# Patient Record
Sex: FEMALE | Race: WHITE | NOT HISPANIC OR LATINO | Employment: FULL TIME | URBAN - METROPOLITAN AREA
[De-identification: names, ages, dates, MRNs, and addresses within clinical notes are randomized per-mention and may not be internally consistent; named-entity substitution may affect disease eponyms.]

---

## 2024-01-03 ENCOUNTER — OFFICE VISIT (OUTPATIENT)
Dept: HEMATOLOGY/ONCOLOGY | Facility: CLINIC | Age: 54
End: 2024-01-03
Payer: COMMERCIAL

## 2024-01-03 DIAGNOSIS — Z12.11 COLON CANCER SCREENING: ICD-10-CM

## 2024-01-03 DIAGNOSIS — Z80.42 FAMILY HISTORY OF PROSTATE CANCER: ICD-10-CM

## 2024-01-03 DIAGNOSIS — Z80.3 FAMILY HISTORY OF BREAST CANCER: ICD-10-CM

## 2024-01-03 DIAGNOSIS — Z71.83 ENCOUNTER FOR NONPROCREATIVE GENETIC COUNSELING: Primary | ICD-10-CM

## 2024-01-03 PROCEDURE — 99205 OFFICE O/P NEW HI 60 MIN: CPT | Mod: S$GLB,,, | Performed by: PHYSICIAN ASSISTANT

## 2024-01-03 PROCEDURE — 99999 PR PBB SHADOW E&M-EST. PATIENT-LVL III: CPT | Mod: PBBFAC,,, | Performed by: PHYSICIAN ASSISTANT

## 2024-01-03 NOTE — PROGRESS NOTES
Hereditary/High Risk Clinic  Department of Hematology and Oncology  [facility]    Cancer Genetics  Initial Consultation    Date of Service:  1/3/24  Visit Provider:  Jessica Leyva PA-C  Collaborating Physician:  Ofe Mao MD    Patient:  Chapincito Burns  :  1970  MRN:   [id number]     Referring Provider    Nidia Neal PA-C  [address] [facility]  [address]  [place] [address]    ASSESSMENT   Chapincito Burns, 53 y.o., assigned female sex at birth, with a personal/family history significant for the following:   Chapincito: None  Mother: Breast 68, living at 74  Maternal aunt: Breast at 60, contralateral at 70,  around 70, no genetic testing  Maternal aunt (Nataly): Breast 75, living, genetic testing negative  MGM: Breast 60,  at 66  Maternal uncle: Prostate     Based on the information provided by Chapincito, her family history of breast cancer in four individuals who are first-degree relatives plus an FDR with prostate cancer is slightly suggestive of a potential hereditary predisposition to cancer and meets current clinical guidelines for genetic testing.  The most informative person to have genetic testing would be Deysi's mother, since she had breast cancer and since Deysi can only inherit mutations her mother has. She will talk to her about testing. The paternal family is not concerning for a hereditary predisposition to cancer. Her paternal aunts had liver and vaginal cancer but they are not known to be hereditary.     PLAN   Deysi will speak to her mother about genetic testing.    Follow up for subsequent discussion regarding genetic testing.  Continue follow up with the high risk breast clinic.   New order placed for colonoscopy. Looks like the other one got closed out.     Visit Diagnosis:   1. Encounter for nonprocreative genetic counseling    2. Family history of breast cancer  - Ambulatory referral/consult to Genetics    3. Family history of prostate  cancer    4. Colon cancer screening  - Ambulatory referral/consult to Endo Procedure ; Future    Questions were encouraged and answered to the patient's satisfaction, and she verbalized understanding of information and agreement with the plan.       Approximately 50 minutes were spent face-to-face with the patient.  Approximately 70 minutes in total were spent on this encounter, which includes face-to-face time and non-face-to-face time preparing to see the patient (e.g., review of tests), obtaining and/or reviewing separately obtained history, documenting clinical information in the electronic or other health record, independently interpreting results (not separately reported) and communicating results to the patient/family/caregiver, or care coordination (not separately reported).     SUBJECTIVE   Chief Complaint: Genetic evaluation  History of Present Illness (HPI):  Chapincito Burns is new to the [facility] Department of Hematology and Oncology and to me.  She presents for genetic evaluation due to her personal/family history of cancer.      Genetic Assessment History:  Germline cancer-genetic testing: no  Personal history of cancer:  no  Benign tumors:  no  Pancreatitis:  no  Chemoprevention: Never used  Uterus and ovaries intact:  yes    Cancer Screening:   Colonoscopy: No exam yet.   Mammogram: 1/2023, TC 27.55%    Past Medical History:   Diagnosis Date    AR (allergic rhinitis)     History of endometriosis     Hypertension     Overweight(278.02)     Sinusitis      Patient Active Problem List    Diagnosis Date Noted    Increased risk of breast cancer 09/06/2023    Essential hypertension 07/28/2017      Family History  Germline cancer-genetic testing in blood relatives:  Yes  Maternal aunt (Nataly): Breast 73, negative genetic testing  Ashkenazi [place] ancestry: No  Consanguinity in ancestors:  No  No known history of cancer of colorectal polyps in extended family other than noted below.     ** If the  pedigree is small/illegible, expand this note window horizontally to view the pedigree in a larger format. A copy of the pedigree is also available in Media.**      Family History   Problem Relation Age of Onset    Breast cancer Mother 68        ER/MN+ DCIS s/p mastectomy. No genetic testing.    Asthma Father     Lung disease Father         s/p lobectomy for cancer vs precancer; former smoker    Breast cancer Maternal Grandmother 60    Diabetes Paternal Grandmother     Prostate cancer Paternal Grandfather     Breast cancer Maternal Aunt 49        contralateral at 60, no genetic testing    Breast cancer Maternal Aunt 73        Negative genetic testing    Prostate cancer Maternal Uncle     Vaginal cancer Paternal Aunt     Liver cancer Paternal Aunt     Other Paternal Aunt         brain?    Colon cancer Neg Hx     Ovarian cancer Neg Hx     Stroke Neg Hx     Hypertension Neg Hx       Review of Systems  See HPI.    Pain 2/10  Recent falls: None   Patient's Distress Score today was 4/10 (with 10 being the worst).      OBJECTIVE   Physical Exam  Very pleasant patient.  Unaccompanied  Vitals signs:  There were no vitals filed for this visit.   Constitutional: No apparent distress.   Pulmonary: Normal effort  Neurological: Alert and oriented. No obvious neurological deficits.   Psychiatric: Normal mood, affect, thought content, speech, behavior, judgment.  Genetics-specific: It is my assessment that the patient is ready to proceed with cancer-genetic testing from a psychosocial perspective.    COUNSELING   Types of cancer:  Hereditary cancer: Approximately one out of ten cancers is hereditary. This means it is caused by an inherited mutation/variant in a single gene that is passed directly from parent to child. These families usually have multiple individuals in successive generations (parents and their children) with the same or related cancers occurring at a younger age than usual.   Sporadic cancer: Approximately nine out  of ten cancers are sporadic or random. This means they are caused by genetic mutations acquired during a person's lifetime. These mutations can be caused by environmental, lifestyle, or medical risk factors or even random errors that occur during DNA replication. These families usually have individuals with unrelated cancers at older ages that occur randomly in the family.   Familial cancer: Some families may have what is called familial cancer. This means they have a clustering of a particular cancer that is more than you would expect to see by chance, but is not caused by an inherited mutation in a single gene. Instead, they are caused by a combination of shared risk factors.     Risk factors for cancer:   Lifestyle:  Smoking, alcohol consumption, obesity, lack of exercise, unhealthy diet.   Environmental: Chemical and radiation exposures in the workplace, contaminated air and water, UV exposure from the sun and tanning beds, and ionizing radiation from xrays and CT scans.   Medical conditions: Chronic inflammation (Crohn's disease, diabetes), viral infections (HPV, hepatitis), fatty liver disease, etc.     Possible results of genetic testing:   Positive: There is a mutation in a gene that increases the chance for certain types of cancer. While mutations increase cancer risk, not everyone with a mutation will develop cancer.    Negative: No disease-causing mutations were found in the genes that were tested.   Variant of uncertain significance: There is a genetic variation that has an unknown impact on cancer risk and does not warrant changes to medical management. In most cases, these variants are found to be harmless.    Genetic testing logistics:  Standard method: A blood sample is collected at an [facility] lab and sent to an outside genetics lab for testing. Blood specimens can be utilized for DNA and RNA analysis.     Cost of testing:   Genetic testing is expensive, but is covered by most health insurance  policies. With commercial insurance coverage, most people pay up to $100 and a max of $250 if they haven't met their deductible. If testing isn't covered by insurance, the cash price is $250.  Some genetics labs offer financial assistance.     Genetic information discrimination:  Genetic information discrimination occurs when an employer, insurance company, or other entity uses genetic information to make decisions or otherwise discriminate against an individual. Examples would include an insurance refusing to issue a policy because the individual has a genetic mutation or an employer refusing to hire or promote someone because they discovered they have a mutation or family history of cancer. A federal law called DAMARIS provide some protections for health insurance and employment. Other laws and policies offer additional protections against genetic discrimination.    The Genetic Information Nondiscrimination Act of 2008 (DAMARIS) is a federal law that expands the protections included in the Health Insurance Portability and Accountability Act of 1996 (HIPAA).  Under Title I of DAMARIS, group health plans cannot base premiums for a plan or a group of similarly situated individuals on genetic information. DAMARIS generally prohibits plans from requesting or requiring an individual to undergo genetic tests, and prohibits a plan from collecting genetic information (including family medical history) prior to or in connection with enrollment, or for underwriting purposes.  This rule does not apply to individuals who receive their insurance through the federal government or . Those entities have their own set of rules regarding genetic information.   This rule only applies to health insurance. Other types of insurance (life, disability, long-term care, cancer, etc) are not protected. An individual can be denied coverage or charged a higher premium based on their genetic information.   Under Title II of DAMARIS, it is illegal to  discriminate against employees or applicants because of genetic information. Title II of DAMARIS prohibits the use of genetic information in making employment decisions, restricts employers and other entities covered by Title II (employment agencies, labor organizations and joint labor-management training and apprenticeship  DAMARIS has a small business exemption for employers with less than 15 employees.    REFERENCES   National Comprehensive Cancer Network (NCCN). (2021). Genetic/familial high-risk assessment: Breast, ovarian, and pancreatic. NCCN Clinical Practice Guidelines in Oncology (NCCN Guidelines), Version 1.2022.  National Comprehensive Cancer Network (NCCN). (2021). Prostate cancer. NCCN Clinical Practice Guidelines in Oncology (NCCN Guidelines), Version 1.2022.    RANDY Waite, PA-C  Physician Assistant, Hereditary/High Risk Clinic  Hematology/Oncology, [facility]

## 2024-01-03 NOTE — PATIENT INSTRUCTIONS
Cancer risk reduction strategies  Most cancers are caused by a combination of aging, environmental exposures (chemicals, radiation), lifestyle factors (alcohol, smoking, obesity, sedentary lifestyle, poor diet), and medical conditions that cause infection or chronic inflammation in the body (diabetes, fatty liver disease, and viruses like HPV).     Don't smoke. Avoid second-hand smoke.   Limit alcohol consumption.   Exercise at least 150 minutes per week.   Maintain a healthy weight.   Eat a healthy diet.   Plant-based diets like the Mediterranean diet can reduce your risk for cancer and other medical conditions and help you live a longer, healthier life.   Eat more vegetables, fruits, nuts, beans, and whole grains. Healthy proteins include eggs, poultry, and seafood.   Limit red meat to no more than three portions per week.   Avoid processed foods. These foods can contain artificial preservatives and may be high in salt, fat, and sugar. This includes sugary drinks, processed meats (deli meat, hot dogs, sausages), frozen pizza/meals, packaged baked goods (cookies, crackers, chips), most breakfast cereals, canned/instant soup, boxed instant pasta products, and sweetened yogurt.   Take steps to control or resolve any chronic medical conditions.   Protect against sexually transmitted infections.   Avoid UV exposure from the sun and tanning beds.   Wear all recommended protective gear in the workplace and report any safety concerns.   Engage in all recommended cancer screenings.     Types of cancer:  Hereditary cancer: Approximately one out of ten cancers is hereditary. This means it is caused by an inherited mutation/variant in a single gene that is passed directly from parent to child. These families usually have multiple individuals in successive generations (parents and their children) with the same or related cancers occurring at a younger age than usual.   Sporadic cancer: Approximately nine out of ten cancers are  sporadic or random. This means they are caused by genetic mutations acquired during a person's lifetime. These mutations can be caused by environmental, lifestyle, or medical risk factors or even random errors that occur during DNA replication. These families usually have individuals with unrelated cancers at older ages that occur randomly in the family.   Familial cancer: Some families may have what is called familial cancer. This means they have a clustering of a particular cancer that is more than you would expect to see by chance, but is not caused by an inherited mutation in a single gene. Instead, they are caused by a combination of shared risk factors.     Risk factors for cancer:   Lifestyle:  Smoking, alcohol consumption, obesity, lack of exercise, unhealthy diet.   Environmental: Chemical and radiation exposures in the workplace, contaminated air and water, UV exposure from the sun and tanning beds, and ionizing radiation from xrays and CT scans.   Medical conditions: Chronic inflammation (Crohn's disease, diabetes), viral infections (HPV, hepatitis), fatty liver disease, etc.     Possible results of genetic testing:   Positive: There is a mutation in a gene that increases the chance for certain types of cancer. While mutations increase cancer risk, not everyone with a mutation will develop cancer.    Negative: No disease-causing mutations were found in the genes that were tested.   Variant of uncertain significance: There is a genetic variation that has an unknown impact on cancer risk and does not warrant changes to medical management. In most cases, these variants are found to be harmless.    Genetic testing logistics:  Standard method: A blood sample is collected at an [facility] lab and sent to an outside genetics lab for testing. Blood specimens can be utilized for DNA and RNA analysis.     Cost of testing:   Genetic testing is expensive, but is covered by most health insurance policies. With commercial  insurance coverage, most people pay up to $100 and a max of $250 if they haven't met their deductible. If testing isn't covered by insurance, the cash price is $250.  Some genetics labs offer financial assistance.     Genetic information discrimination:  Genetic information discrimination occurs when an employer, insurance company, or other entity uses genetic information to make decisions or otherwise discriminate against an individual. Examples would include an insurance refusing to issue a policy because the individual has a genetic mutation or an employer refusing to hire or promote someone because they discovered they have a mutation or family history of cancer. A federal law called DAMARIS provide some protections for health insurance and employment. Other laws and policies offer additional protections against genetic discrimination.    The Genetic Information Nondiscrimination Act of 2008 (DAMARIS) is a federal law that expands the protections included in the Health Insurance Portability and Accountability Act of 1996 (HIPAA).  Under Title I of DAMARIS, group health plans cannot base premiums for a plan or a group of similarly situated individuals on genetic information. DAMARIS generally prohibits plans from requesting or requiring an individual to undergo genetic tests, and prohibits a plan from collecting genetic information (including family medical history) prior to or in connection with enrollment, or for underwriting purposes.  This rule does not apply to individuals who receive their insurance through the federal government or . Those entities have their own set of rules regarding genetic information.   This rule only applies to health insurance. Other types of insurance (life, disability, long-term care, cancer, etc) are not protected. An individual can be denied coverage or charged a higher premium based on their genetic information.   Under Title II of DAMARIS, it is illegal to discriminate against  employees or applicants because of genetic information. Title II of DAMARIS prohibits the use of genetic information in making employment decisions, restricts employers and other entities covered by Title II (employment agencies, labor organizations and joint labor-management training and apprenticeship  DAMARIS has a small business exemption for employers with less than 15 employees.

## 2024-01-26 ENCOUNTER — TELEPHONE (OUTPATIENT)
Dept: ENDOSCOPY | Facility: HOSPITAL | Age: 54
End: 2024-01-26
Payer: COMMERCIAL

## 2024-01-26 NOTE — TELEPHONE ENCOUNTER
Spoke to pt to schedule procedure(s) Colonoscopy       Physician to perform procedure(s) Dr. DEE DEE Cruz  Date of Procedure (s) 4/25/24  Arrival Time 6:00 AM  Time of Procedure(s) 7:00 AM   Location of Procedure(s) [place] 2nd Floor  Type of Rx Prep sent to patient: PEG  Instructions provided to patient via [facility]    Patient was informed on the following information and verbalized understanding. Screening questionnaire reviewed with patient and complete. If procedure requires anesthesia, a responsible adult needs to be present to accompany the patient home, patient cannot drive after receiving anesthesia. Appointment details are tentative, especially check-in time. Patient will receive a prep-op call 7 days prior to confirm check-in time for procedure. If applicable the patient should contact their pharmacy to verify Rx for procedure prep is ready for pick-up. Patient was advised to call the scheduling department at [phone] if pharmacy states no Rx is available. Patient was advised to call the endoscopy scheduling department if any questions or concerns arise.      [facility] Scheduling Department

## 2024-02-05 ENCOUNTER — OFFICE VISIT (OUTPATIENT)
Dept: URGENT CARE | Facility: CLINIC | Age: 54
End: 2024-02-05
Payer: COMMERCIAL

## 2024-02-05 VITALS
HEART RATE: 88 BPM | DIASTOLIC BLOOD PRESSURE: 88 MMHG | SYSTOLIC BLOOD PRESSURE: 144 MMHG | RESPIRATION RATE: 18 BRPM | WEIGHT: 271 LBS | TEMPERATURE: 97 F | BODY MASS INDEX: 53.2 KG/M2 | HEIGHT: 60 IN | OXYGEN SATURATION: 96 %

## 2024-02-05 DIAGNOSIS — J02.0 STREP PHARYNGITIS: Primary | ICD-10-CM

## 2024-02-05 LAB
CTP QC/QA: YES
CTP QC/QA: YES
MOLECULAR STREP A: POSITIVE
POC MOLECULAR INFLUENZA A AGN: NEGATIVE
POC MOLECULAR INFLUENZA B AGN: NEGATIVE

## 2024-02-05 PROCEDURE — 99213 OFFICE O/P EST LOW 20 MIN: CPT | Mod: S$GLB,,, | Performed by: FAMILY MEDICINE

## 2024-02-05 PROCEDURE — 87651 STREP A DNA AMP PROBE: CPT | Mod: QW,S$GLB,, | Performed by: FAMILY MEDICINE

## 2024-02-05 PROCEDURE — 87502 INFLUENZA DNA AMP PROBE: CPT | Mod: QW,S$GLB,, | Performed by: FAMILY MEDICINE

## 2024-02-05 RX ORDER — AMOXICILLIN 875 MG/1
875 TABLET, FILM COATED ORAL 2 TIMES DAILY
Qty: 20 TABLET | Refills: 0 | Status: SHIPPED | OUTPATIENT
Start: 2024-02-05 | End: 2024-02-15

## 2024-02-05 NOTE — PROGRESS NOTES
Subjective:      Patient ID: Chapincito Burns is a 53 y.o. female.    Vitals:  height is 5' (1.524 m) and weight is 122.9 kg (271 lb). Her oral temperature is 97.2 °F (36.2 °C). Her blood pressure is 144/88 (abnormal) and her pulse is 88. Her respiration is 18 and oxygen saturation is 96%.     Chief Complaint: Sore Throat    This is a 53 y.o. female who presents today with a chief complaint of  sore throat, headache, low grade fever, ear pain, crusty eyes - started yesterday     Pt tested positive for Covid before Rachel     Sore Throat   This is a new problem. The current episode started today. Associated symptoms include congestion, coughing, ear pain, headaches and a hoarse voice. Pertinent negatives include no abdominal pain, diarrhea, drooling, ear discharge, plugged ear sensation, neck pain, shortness of breath, stridor, swollen glands, trouble swallowing or vomiting. She has tried acetaminophen and NSAIDs for the symptoms.     HENT:  Positive for ear pain, congestion and sore throat. Negative for ear discharge, drooling and trouble swallowing.    Neck: Negative for neck pain.   Respiratory:  Positive for cough. Negative for shortness of breath and stridor.    Gastrointestinal:  Negative for abdominal pain, vomiting and diarrhea.   Neurological:  Positive for headaches.      Objective:     Physical Exam   Constitutional: She appears ill. obesity  HENT:   Head: Normocephalic and atraumatic.   Nose: Congestion present.   Mouth/Throat: Mucous membranes are moist. Oropharyngeal exudate and posterior oropharyngeal erythema present.   Cardiovascular: Normal rate, regular rhythm, normal heart sounds and normal pulses.   Pulmonary/Chest: Effort normal and breath sounds normal.   Abdominal: Normal appearance.   Lymphadenopathy:     She has cervical adenopathy.   Neurological: She is alert.   Nursing note and vitals reviewed.    Assessment:     1. Strep pharyngitis        Plan:       Strep pharyngitis  -     POCT  Influenza A/B MOLECULAR  -     Cancel: SARS Coronavirus 2 Antigen, POCT Manual Read  -     POCT Strep A, Molecular  -     amoxicillin (AMOXIL) 875 MG tablet; Take 1 tablet (875 mg total) by mouth 2 (two) times daily. for 10 days  Dispense: 20 tablet; Refill: 0

## 2024-02-05 NOTE — LETTER
February 5, 2024      [facility] [place]  [address]  [facility] [address]  Phone: [phone]  Fax: [phone]       Patient: Chapincito Bruns   YOB: 1970  Date of Visit: 02/05/2024    To Whom It May Concern:    Dana Burns  was at [facility] on 02/05/2024. The patient may return to work/school on 02/07/2024 with no restrictions. If you have any questions or concerns, or if I can be of further assistance, please do not hesitate to contact me.    Sincerely,            Axel Cook MD

## 2024-02-19 ENCOUNTER — HOSPITAL ENCOUNTER (OUTPATIENT)
Dept: RADIOLOGY | Facility: HOSPITAL | Age: 54
Discharge: HOME | End: 2024-02-19
Attending: PHYSICIAN ASSISTANT
Payer: COMMERCIAL

## 2024-02-19 DIAGNOSIS — Z12.39 BREAST CANCER SCREENING, HIGH RISK PATIENT: ICD-10-CM

## 2024-02-19 DIAGNOSIS — Z91.89 INCREASED RISK OF BREAST CANCER: ICD-10-CM

## 2024-02-19 DIAGNOSIS — Z80.3 FAMILY HISTORY OF BREAST CANCER: ICD-10-CM

## 2024-02-19 PROCEDURE — A9577 INJ MULTIHANCE: HCPCS | Performed by: PHYSICIAN ASSISTANT

## 2024-02-19 PROCEDURE — 25500020 PHARM REV CODE 255: Performed by: PHYSICIAN ASSISTANT

## 2024-02-19 PROCEDURE — 77049 MRI BREAST C-+ W/CAD BI: CPT | Mod: 26,,, | Performed by: RADIOLOGY

## 2024-02-19 PROCEDURE — 77049 MRI BREAST C-+ W/CAD BI: CPT | Mod: TC

## 2024-02-19 RX ADMIN — GADOBENATE DIMEGLUMINE 19 ML: 529 INJECTION, SOLUTION INTRAVENOUS at 03:02

## 2024-04-23 ENCOUNTER — PATIENT MESSAGE (OUTPATIENT)
Dept: ORTHOPEDICS | Facility: CLINIC | Age: 54
End: 2024-04-23
Payer: COMMERCIAL

## 2024-04-23 ENCOUNTER — PATIENT MESSAGE (OUTPATIENT)
Dept: ENDOSCOPY | Facility: HOSPITAL | Age: 54
End: 2024-04-23
Payer: COMMERCIAL

## 2024-04-23 DIAGNOSIS — M25.561 ACUTE PAIN OF BOTH KNEES: Primary | ICD-10-CM

## 2024-04-23 DIAGNOSIS — M25.562 ACUTE PAIN OF BOTH KNEES: Primary | ICD-10-CM

## 2024-04-24 ENCOUNTER — ANESTHESIA EVENT (OUTPATIENT)
Dept: ENDOSCOPY | Facility: HOSPITAL | Age: 54
End: 2024-04-24
Payer: COMMERCIAL

## 2024-04-24 NOTE — ANESTHESIA PREPROCEDURE EVALUATION
04/24/2024  Chapincito Burns is a 53 y.o., female.    Pre-operative evaluation for Procedure(s) (LRB):  COLONOSCOPY (N/A)    Chapincito Burns is a 53 y.o. female     Patient Active Problem List   Diagnosis    Essential hypertension    Increased risk of breast cancer       Review of patient's allergies indicates:  No Known Allergies    No current facility-administered medications on file prior to encounter.     Current Outpatient Medications on File Prior to Encounter   Medication Sig Dispense Refill    albuterol (VENTOLIN HFA) 90 mcg/actuation inhaler Inhale 1-2 puffs into the lungs every 6 (six) hours as needed for Wheezing or Shortness of Breath. Rescue 18 g 0    amLODIPine (NORVASC) 10 MG tablet Take 1 tablet (10 mg total) by mouth once daily. 90 tablet 3    cetirizine (ZYRTEC) 10 MG tablet Take 10 mg by mouth once daily.         Past Surgical History:   Procedure Laterality Date    ADENOIDECTOMY      PELVIC LAPAROSCOPY  2002    CUROLE    TONSILLECTOMY      TUBE THORACOTOMY           Pre-op Assessment    I have reviewed the Patient Summary Reports.     I have reviewed the Nursing Notes. I have reviewed the NPO Status.   I have reviewed the Medications.     Review of Systems  Anesthesia Hx:  No problems with previous Anesthesia                Cardiovascular:     Hypertension   Denies MI.                                         Pulmonary:    Asthma     Denies Sleep Apnea.                Hepatic/GI:      Denies GERD.             Neurological:  Denies TIA.  Denies CVA.    Denies Seizures.                                Endocrine:  Denies Diabetes.         Morbid Obesity / BMI > 40      Physical Exam  General: Cooperative    Airway:  Mallampati: II   Mouth Opening: Normal  TM Distance: Normal  Tongue: Normal  Neck ROM: Normal ROM    Dental:  Intact        Anesthesia Plan  Type of Anesthesia, risks &  benefits discussed:    Anesthesia Type: Gen Natural Airway  Intra-op Monitoring Plan: Standard ASA Monitors  Post Op Pain Control Plan: multimodal analgesia  Induction:  IV  Informed Consent: Informed consent signed with the Patient and all parties understand the risks and agree with anesthesia plan.  All questions answered.   ASA Score: 2  Day of Surgery Review of History & Physical: H&P Update referred to the surgeon/provider.    Ready For Surgery From Anesthesia Perspective.     .

## 2024-04-25 ENCOUNTER — ANESTHESIA (OUTPATIENT)
Dept: ENDOSCOPY | Facility: HOSPITAL | Age: 54
End: 2024-04-25
Payer: COMMERCIAL

## 2024-04-25 ENCOUNTER — HOSPITAL ENCOUNTER (OUTPATIENT)
Facility: HOSPITAL | Age: 54
Discharge: HOME | End: 2024-04-25
Attending: COLON & RECTAL SURGERY | Admitting: COLON & RECTAL SURGERY
Payer: COMMERCIAL

## 2024-04-25 VITALS
BODY MASS INDEX: 53.99 KG/M2 | OXYGEN SATURATION: 94 % | HEIGHT: 60 IN | TEMPERATURE: 97 F | SYSTOLIC BLOOD PRESSURE: 158 MMHG | HEART RATE: 63 BPM | DIASTOLIC BLOOD PRESSURE: 84 MMHG | WEIGHT: 275 LBS | RESPIRATION RATE: 21 BRPM

## 2024-04-25 DIAGNOSIS — Z12.11 COLON CANCER SCREENING: Primary | ICD-10-CM

## 2024-04-25 LAB
B-HCG UR QL: NEGATIVE
CTP QC/QA: YES

## 2024-04-25 PROCEDURE — 25000003 PHARM REV CODE 250: Performed by: NURSE ANESTHETIST, CERTIFIED REGISTERED

## 2024-04-25 PROCEDURE — 63600175 PHARM REV CODE 636 W HCPCS: Performed by: NURSE ANESTHETIST, CERTIFIED REGISTERED

## 2024-04-25 PROCEDURE — 25000003 PHARM REV CODE 250: Performed by: COLON & RECTAL SURGERY

## 2024-04-25 PROCEDURE — 37000008 HC ANESTHESIA 1ST 15 MINUTES: Performed by: COLON & RECTAL SURGERY

## 2024-04-25 PROCEDURE — G0121 COLON CA SCRN NOT HI RSK IND: HCPCS | Mod: ,,, | Performed by: COLON & RECTAL SURGERY

## 2024-04-25 PROCEDURE — 81025 URINE PREGNANCY TEST: CPT | Performed by: COLON & RECTAL SURGERY

## 2024-04-25 PROCEDURE — D9220A PRA ANESTHESIA: Mod: CRNA,,, | Performed by: NURSE ANESTHETIST, CERTIFIED REGISTERED

## 2024-04-25 PROCEDURE — 37000009 HC ANESTHESIA EA ADD 15 MINS: Performed by: COLON & RECTAL SURGERY

## 2024-04-25 PROCEDURE — D9220A PRA ANESTHESIA: Mod: ANES,,, | Performed by: ANESTHESIOLOGY

## 2024-04-25 PROCEDURE — G0121 COLON CA SCRN NOT HI RSK IND: HCPCS | Performed by: COLON & RECTAL SURGERY

## 2024-04-25 RX ORDER — SODIUM CHLORIDE 9 MG/ML
INJECTION, SOLUTION INTRAVENOUS CONTINUOUS
Status: DISCONTINUED | OUTPATIENT
Start: 2024-04-25 | End: 2024-04-25 | Stop reason: HOSPADM

## 2024-04-25 RX ORDER — PROPOFOL 10 MG/ML
VIAL (ML) INTRAVENOUS
Status: DISCONTINUED | OUTPATIENT
Start: 2024-04-25 | End: 2024-04-25

## 2024-04-25 RX ORDER — CHOLECALCIFEROL (VITAMIN D3) 25 MCG
1000 TABLET ORAL DAILY
COMMUNITY

## 2024-04-25 RX ORDER — FENTANYL CITRATE 50 UG/ML
25 INJECTION, SOLUTION INTRAMUSCULAR; INTRAVENOUS EVERY 5 MIN PRN
Status: DISCONTINUED | OUTPATIENT
Start: 2024-04-25 | End: 2024-04-25 | Stop reason: HOSPADM

## 2024-04-25 RX ORDER — SODIUM CHLORIDE 0.9 % (FLUSH) 0.9 %
3 SYRINGE (ML) INJECTION
Status: DISCONTINUED | OUTPATIENT
Start: 2024-04-25 | End: 2024-04-25 | Stop reason: HOSPADM

## 2024-04-25 RX ORDER — LIDOCAINE HYDROCHLORIDE 20 MG/ML
INJECTION INTRAVENOUS
Status: DISCONTINUED | OUTPATIENT
Start: 2024-04-25 | End: 2024-04-25

## 2024-04-25 RX ADMIN — SODIUM CHLORIDE: 0.9 INJECTION, SOLUTION INTRAVENOUS at 06:04

## 2024-04-25 RX ADMIN — LIDOCAINE HYDROCHLORIDE 100 MG: 20 INJECTION INTRAVENOUS at 07:04

## 2024-04-25 RX ADMIN — PROPOFOL 70 MG: 10 INJECTION, EMULSION INTRAVENOUS at 07:04

## 2024-04-25 NOTE — ANESTHESIA POSTPROCEDURE EVALUATION
Anesthesia Post Evaluation    Patient: Chapicnito Burns    Procedure(s) Performed: Procedure(s) (LRB):  COLONOSCOPY (N/A)    Final Anesthesia Type: general      Patient location during evaluation: PACU  Patient participation: Yes- Able to Participate  Level of consciousness: awake  Post-procedure vital signs: reviewed and stable  Pain management: adequate  Airway patency: patent    PONV status at discharge: No PONV  Anesthetic complications: no      Cardiovascular status: hemodynamically stable  Respiratory status: unassisted and spontaneous ventilation  Hydration status: euvolemic  Follow-up not needed.          Vitals Value Taken Time   /61 04/25/24 0741   Temp 36.1 °C (97 °F) 04/25/24 0741   Pulse 72 04/25/24 0745   Resp 17 04/25/24 0745   SpO2 100 % 04/25/24 0745         No case tracking events are documented in the log.      Pain/Dulce Score: No data recorded

## 2024-04-25 NOTE — H&P
Procedure : Colonoscopy    Indication(s):  asymptomatic screening exam    Last colonoscopy: none    Review of patient's allergies indicates:  No Known Allergies    Past Medical History:   Diagnosis Date    AR (allergic rhinitis)     Asthma     with bad sinus infection and when I had covid    History of endometriosis     Hypertension     Overweight(278.02)     Sinusitis     Vitamin D deficiency        Prior to Admission medications    Medication Sig Start Date End Date Taking? Authorizing Provider   amLODIPine (NORVASC) 10 MG tablet Take 1 tablet (10 mg total) by mouth once daily. 3/28/23  Yes Sylvia Cuevas PA-C   cetirizine (ZYRTEC) 10 MG tablet Take 10 mg by mouth once daily.   Yes Provider, Historical   vitamin D (VITAMIN D3) 1000 units Tab Take 1,000 Units by mouth once daily.   Yes Provider, Historical   albuterol (VENTOLIN HFA) 90 mcg/actuation inhaler Inhale 1-2 puffs into the lungs every 6 (six) hours as needed for Wheezing or Shortness of Breath. Rescue 12/11/23 12/10/24  Mary Rivera NP       Sedation Problems: NO    Family History   Problem Relation Name Age of Onset    Breast cancer Mother Lula 68        ER/IA+ DCIS s/p mastectomy. No genetic testing.    Asthma Father Luis Manuel     Lung disease Father Luis Manuel         s/p lobectomy for cancer vs precancer; former smoker    Breast cancer Maternal Grandmother  60    Diabetes Paternal Grandmother      Prostate cancer Paternal Grandfather      Breast cancer Maternal Aunt Isidra 49        contralateral at 60, no genetic testing    Breast cancer Maternal Aunt Nataly (-) 73        Negative genetic testing    Prostate cancer Maternal Uncle David     Vaginal cancer Paternal Aunt Dacia     Liver cancer Paternal Aunt Siobhan     Other Paternal Aunt Isidra         brain?    Colon cancer Neg Hx      Ovarian cancer Neg Hx      Stroke Neg Hx      Hypertension Neg Hx         Fam Hx of Sedation Problems: NO    Social History     Socioeconomic History    Marital  status: Single   Occupational History     Comment: teacher   Tobacco Use    Smoking status: Never     Passive exposure: Never    Smokeless tobacco: Never   Substance and Sexual Activity    Alcohol use: No    Drug use: No    Sexual activity: Yes     Partners: Male     Birth control/protection: Injection     Social Determinants of Health     Financial Resource Strain: Low Risk  (1/2/2024)    Overall Financial Resource Strain (CARDIA)     Difficulty of Paying Living Expenses: Not very hard   Food Insecurity: No Food Insecurity (1/2/2024)    Hunger Vital Sign     Worried About Running Out of Food in the Last Year: Never true     Ran Out of Food in the Last Year: Never true   Transportation Needs: No Transportation Needs (1/2/2024)    PRAPARE - Transportation     Lack of Transportation (Medical): No     Lack of Transportation (Non-Medical): No   Physical Activity: Insufficiently Active (1/2/2024)    Exercise Vital Sign     Days of Exercise per Week: 3 days     Minutes of Exercise per Session: 10 min   Stress: No Stress Concern Present (1/2/2024)    [employer] - Occupational Stress Questionnaire     Feeling of Stress : Only a little   Social Connections: Unknown (1/2/2024)    Social Connection and Isolation Panel [NHANES]     Frequency of Communication with Friends and Family: More than three times a week     Frequency of Social Gatherings with Friends and Family: More than three times a week     Active Member of Clubs or Organizations: Yes     Attends Club or Organization Meetings: More than 4 times per year     Marital Status: Never    Housing Stability: Low Risk  (1/2/2024)    Housing Stability Vital Sign     Unable to Pay for Housing in the Last Year: No     Number of Places Lived in the Last Year: 1     Unstable Housing in the Last Year: No       Review of Systems -     Respiratory ROS: no cough, shortness of breath, or wheezing  Cardiovascular ROS: no chest pain or dyspnea on  exertion  Gastrointestinal ROS: no abdominal pain, change in bowel habits, or black or bloody stools  Musculoskeletal ROS: negative  Neurological ROS: no TIA or stroke symptoms        Physical Exam:  General: no distress  Head: normocephalic  Airway:  normal oropharynx, airway normal  Neck: supple, symmetrical, trachea midline  Lungs:  normal respiratory effort  Heart: regular rate and rhythm  Abdomen: soft, non-tender non-distented; bowel sounds normal; no masses,  no organomegaly  Extremities: no cyanosis or edema, or clubbing       Deep Sedation: Mallampati Score per anesthesia     SedationPlan :Moderate     ASA : III    Patient is medically cleared for anesthesia.    Anesthesia/Surgery risks, benefits and alternative options discussed and understood by patient/family.

## 2024-04-25 NOTE — TRANSFER OF CARE
Anesthesia Transfer of Care Note    Patient: Chapincito Burns    Procedure(s) Performed: Procedure(s) (LRB):  COLONOSCOPY (N/A)    Patient location: [facility]    Anesthesia Type: general    Transport from OR: Transported from OR on 6-10 L/min O2 by face mask with adequate spontaneous ventilation    Post pain: adequate analgesia    Post assessment: no apparent anesthetic complications and tolerated procedure well    Post vital signs: stable    Level of consciousness: awake, alert and oriented    Nausea/Vomiting: no nausea/vomiting    Complications: none    Transfer of care protocol was followed      Last vitals: Visit Vitals  /64   Pulse 93   Temp 37.1 °C (98.8 °F) (Temporal)   Resp 16   Ht 5' (1.524 m)   Wt 124.7 kg (275 lb)   LMP 08/25/2023   SpO2 94%   Breastfeeding No   BMI 53.71 kg/m²

## 2024-04-25 NOTE — PROVATION PATIENT INSTRUCTIONS
Discharge Summary/Instructions after an Endoscopic Procedure  Patient Name: Chapincito Burns  Patient MRN: [id number]  Patient YOB: 1970  Thursday, April 25, 2024  Murphy Cruz MD  Dear patient,  As a result of recent federal legislation (The Federal Cures Act), you may   receive lab or pathology results from your procedure in your [facility]   account before your physician is able to contact you. Your physician or   their representative will relay the results to you with their   recommendations at their soonest availability.  Thank you,  RESTRICTIONS:  During your procedure today, you received medications for sedation.  These   medications may affect your judgment, balance and coordination.  Therefore,   for 24 hours, you have the following restrictions:   - DO NOT drive a car, operate machinery, make legal/financial decisions,   sign important papers or drink alcohol.    ACTIVITY:  Today: no heavy lifting, straining or running due to procedural   sedation/anesthesia.  The following day: return to full activity including work.  DIET:  Eat and drink normally unless instructed otherwise.     TREATMENT FOR COMMON SIDE EFFECTS:  - Mild abdominal pain, nausea, belching, bloating or excessive gas:  rest,   eat lightly and use a heating pad.  - Sore Throat: treat with throat lozenges and/or gargle with warm salt   water.  - Because air was used during the procedure, expelling large amounts of air   from your rectum or belching is normal.  - If a bowel prep was taken, you may not have a bowel movement for 1-3 days.    This is normal.  SYMPTOMS TO WATCH FOR AND REPORT TO YOUR PHYSICIAN:  1. Abdominal pain or bloating, other than gas cramps.  2. Chest pain.  3. Back pain.  4. Signs of infection such as: chills or fever occurring within 24 hours   after the procedure.  5. Rectal bleeding, which would show as bright red, maroon, or black stools.   (A tablespoon of blood from the rectum is not serious, especially  if   hemorrhoids are present.)  6. Vomiting.  7. Weakness or dizziness.  GO DIRECTLY TO THE NEAREST EMERGENCY ROOM IF YOU HAVE ANY OF THE FOLLOWING:      Difficulty breathing              Chills and/or fever over 101 F   Persistent vomiting and/or vomiting blood   Severe abdominal pain   Severe chest pain   Black, tarry stools   Bleeding- more than one tablespoon   Any other symptom or condition that you feel may need urgent attention  Your doctor recommends these additional instructions:  If any biopsies were taken, your doctors clinic will contact you in 1 to 2   weeks with any results.  - Discharge patient to home.   - High fiber diet.   - Continue present medications.   - Patient has a contact number available for emergencies.  The signs and   symptoms of potential delayed complications were discussed with the   patient.  Return to normal activities tomorrow.  Written discharge   instructions were provided to the patient.   - Repeat colonoscopy in 10 years for screening purposes.  For questions, problems or results please call your physician - Murphy Cruz MD at Work:  [phone].  [facility], EMERGENCY ROOM PHONE NUMBER: [phone]  IF A COMPLICATION OR EMERGENCY SITUATION ARISES AND YOU ARE UNABLE TO REACH   YOUR PHYSICIAN - GO DIRECTLY TO THE EMERGENCY ROOM.  Murphy Cruz MD  4/25/2024 7:36:11 AM  This report has been verified and signed electronically.  Dear patient,  As a result of recent federal legislation (The Federal Cures Act), you may   receive lab or pathology results from your procedure in your [facility]   account before your physician is able to contact you. Your physician or   their representative will relay the results to you with their   recommendations at their soonest availability.  Thank you,  PROVATION

## 2024-04-25 NOTE — PROGRESS NOTES
Subjective:     HPI:   Chapincito Burns is a 53 y.o. female who presents for eval B knees L>R    History of Present Illness  The patient presents for evaluation of bilateral knee pain.    The patient reports experiencing discomfort in both knees, with the left knee being more severely affected than the right. She has no history of myocardial infarction, cardiac stents, diabetes, thromboembolic events, or significant cardiac, pulmonary, hepatic, or renal complications. She has no history of knee surgeries. Her current medication regimen includes over-the-counter vitamin D 1000 IU and Aleve, administered twice daily. She has not undergone any injections or therapeutic interventions. An x-ray was conducted in 2019, during which she was informed that no further treatment options were available. She sustained a knee injury on Easter, during which her knee jumped onto her back and collided with a table. Since the incident, she has experienced increased soreness. The pain is primarily localized in the anterior and medial aspects of the knee.        Past surgical history: None    Hx DVT: None    Medications: Aleve (440mg, bid)    Injections: None    Physical Therapy: did aquatic therapy 10+ years ago    Bracing: Yes, HKB from [employer], the patient said that the HKB is helpful when she wears it with crutches.      Assistive Devices: Yes, crutches, the patient uses her crutches for long walks.     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    Social support: The patient stated that they live at home alone. The patient stated that their mom and sister live in the area and  would be able to help take care of them if they were to have surgery.     The patient was referred from a colleague. Her colleagues's brother, Tai Singh, is  a former patient of Dr. Singh's.       ROS:  The updated medical history is in the chart and has been reviewed. A review of systems is updated and there is no reported vision changes, ear/nose/mouth/throat complaints,  chest pain, shortness of breath, abdominal pain, urological complaints, fevers or chills, psychiatric complaints. Musculoskeletal and neurologcial symptoms are as documented. All other systems are negative.      Objective:   Exam:  There were no vitals filed for this visit.  Body mass index is 51.95 kg/m².    Physical examination included assessment of the patient's general appearance with particular attention to development, nutrition, body habitus, attention to grooming, and any evidence of distress.  Constitutional: The patient is a well-developed, well-nourished patient in no acute distress.   Cardiovascular: Vascular examination included warmth and capillary refill as well inspection for edema and assessment of pedal pulses. Pulses are palpable and regular.  Musculoskeletal: Gait was assessed as to whether it was steady, non-antalgic, and/or required the use of an assist device. The patient was also asked to walk independently and get onto the examination table.  Skin: The skin was examined for any obvious rashes or lesions in the extremity.  Neurologic: Sensation is intact to light touch in the extremity. The patient has good coordination without hyperreflexia and is alert and oriented to person, place and time and has normal mood and affect.     All of the above were examined and found to be within normal limits except for the following pertinent clinical findings:    Physical Exam  The patient is able to get up out of the wheelchair. She has a severe limp and an antalgic gait, with the left side being more affected than the right. There is no groin pain during active straight leg raise. There is no pain with active or passive range of motion of the hip joints. Distally, the knee is  neurovascularly intact with good strength, sensation, and pulses. The left knee has a 15 to 90 degree motion. The right knee has a 5 to 100 degree range of motion, neutral alignment, and overall feels like it corrects a little bit into valgus. The knees are tender to palpation at the medial, lateral, and patellofemoral joint lines with mild effusions. The knees are stable to anterior, posterior varus and valgus stresses with flexion contractures and no extensor lags.          Imaging:    Results  Imaging  X-rays of the knees show grade 4 severe bone-on-bone contact.    KNEE R ARTHRITIS    Indication:  Right knee pain  Exam Ordered: Radiographs of the right knee include a standing anteroposterior view, a standing posterioanterior view, a lateral view in full flexion, and a sunrise view  Details of Examination: Exam shows evidence of joint space narrowing, osteophyte formation, and subchondral sclerosis, all consistent with degenerative arthritis of the knee.  No other significant findings are noted.  Impression:  Degenerative Arthritis, Right Knee and KNEE L ARTHRITIS     Indication:  Left knee pain  Exam Ordered: Radiographs of the left knee include a standing anteroposterior view, a standing posterioanterior view, a lateral view in full flexion, and a sunrise view  Details of Examination: Exam shows evidence of joint space narrowing, osteophyte formation, and subchondral sclerosis, all consistent with degenerative arthritis of the knee.  No other significant findings are noted.  Impression:  Degenerative Arthritis, Left Knee    B knee Klg4 varus knee arthritis, severe bone on bone      Assessment:       ICD-10-CM ICD-9-CM   1. Arthritis of both knees  M17.0 716.96        BMI 51.9  Alb 3.3  Hgb 11.5  Vit D def, 39, on OTC vit D    Plan:     Assessment & Plan  1. Severe bilateral knee arthritis.  Upon reviewing the x-ray results, it was determined that surgical intervention would be the most suitable long-term  solution for her condition. Nonoperative treatment approach will be initiated to alleviate her symptoms. Additionally, weight loss will be considered as a viable option.    The above findings were discussed with patient length. We discussed the risks of conservative versus surgical management knee arthritis. Conservative management consisting of anti-inflammatory medications, glucosamine/chondroitin sulfate, weight loss, physical therapy, activity modification, as well as injections (lubricant versus corticosteroid) was discussed at length. At this point considering the patient's level of activity, pain, and radiographic findings I recommend continued conservative management of the knee arthritis. TKA when medically optimized    The patient was given a handout with treatment strategies for hip and knee joint care prior to surgery from [employer], the [employer].   This included information regarding medications, injections, weight loss, exercise, braces, physical therapy, and alternative therapies.     I explained the potentially adverse gastric, cardiac, and renal effects of NSAIDs and explained that if the patient wishes to take it for longer that the patient should discuss this with their primary care physician to determine if it is safe to do so.    x Tylenol   Rx mobic Aleve    Voltaren Gel   x [facility] non-op arthritis info    Bursitis info    Total Joint Info    HEP: [employer] home exercise conditioning program    X aquatic PT PT    CSI: intra-articular steroid injection    CSI: greater trochanter bursitis    HA: hyaluronic acid injection   N/A habitus Brace:     Referral:      Needs TKA when medically optimized, BMI  In the meantime Optimize non-op Tx    Ref:   -non op sports med for US guided injections (CSI, HA), non-op management  -bariatric medical and surgical eval    Goal BMI <45 = 230lbs, 266 today = 36 lbs  F/u PRN    No orders of the defined types were placed in this  encounter.      This note was generated with the assistance of ambient listening technology. Verbal consent was obtained by the patient and accompanying visitor(s) for the recording of patient appointment to facilitate this note. I attest to having reviewed and edited the generated note for accuracy, though some syntax or spelling errors may persist. Please contact the author of this note for any clarification.            Past Medical History:   Diagnosis Date    AR (allergic rhinitis)     Asthma     with bad sinus infection and when I had covid    History of endometriosis     Hypertension     Overweight(278.02)     Sinusitis     Vitamin D deficiency        Past Surgical History:   Procedure Laterality Date    ADENOIDECTOMY      COLONOSCOPY N/A 4/25/2024    Procedure: COLONOSCOPY;  Surgeon: Murphy Cruz MD;  [facility] ([address] [place] [address]);  Service: Colon and Rectal;  Laterality: N/A;  BMI: 53  Referral:  Jessica Leyva PA-C  PEG  Inst portal  LW  4/3-precall complete-MS    NOSE SURGERY      nasal passage from hole d/t tooth removal at dentist    PELVIC LAPAROSCOPY  2002    CUROLE    TONSILLECTOMY      TUBE THORACOTOMY         Family History   Problem Relation Name Age of Onset    Breast cancer Mother Lula 68        ER/OR+ DCIS s/p mastectomy. No genetic testing.    Asthma Father Luis Manuel     Lung disease Father Luis Manuel         s/p lobectomy for cancer vs precancer; former smoker    Breast cancer Maternal Grandmother  60    Diabetes Paternal Grandmother      Prostate cancer Paternal Grandfather      Breast cancer Maternal Aunt Isidra 49        contralateral at 60, no genetic testing    Breast cancer Maternal Aunt Nataly (-) 73        Negative genetic testing    Prostate cancer Maternal Uncle David     Vaginal cancer Paternal Aunt Dacia     Liver cancer Paternal Aunt Siobhan     Other Paternal Aunt Isidra         brain?    Colon cancer Neg Hx      Ovarian cancer Neg Hx      Stroke Neg Hx      Hypertension  Neg Hx         Social History     Socioeconomic History    Marital status: Single   Occupational History     Comment: teacher   Tobacco Use    Smoking status: Never     Passive exposure: Never    Smokeless tobacco: Never   Substance and Sexual Activity    Alcohol use: No    Drug use: No    Sexual activity: Yes     Partners: Male     Birth control/protection: Injection     Social Determinants of Health     Financial Resource Strain: Low Risk  (1/2/2024)    Overall Financial Resource Strain (CARDIA)     Difficulty of Paying Living Expenses: Not very hard   Food Insecurity: No Food Insecurity (1/2/2024)    Hunger Vital Sign     Worried About Running Out of Food in the Last Year: Never true     Ran Out of Food in the Last Year: Never true   Transportation Needs: No Transportation Needs (1/2/2024)    PRAPARE - Transportation     Lack of Transportation (Medical): No     Lack of Transportation (Non-Medical): No   Physical Activity: Insufficiently Active (1/2/2024)    Exercise Vital Sign     Days of Exercise per Week: 3 days     Minutes of Exercise per Session: 10 min   Stress: No Stress Concern Present (1/2/2024)    [employer] - Occupational Stress Questionnaire     Feeling of Stress : Only a little   Social Connections: Unknown (1/2/2024)    Social Connection and Isolation Panel [NHANES]     Frequency of Communication with Friends and Family: More than three times a week     Frequency of Social Gatherings with Friends and Family: More than three times a week     Active Member of Clubs or Organizations: Yes     Attends Club or Organization Meetings: More than 4 times per year     Marital Status: Never    Housing Stability: Low Risk  (1/2/2024)    Housing Stability Vital Sign     Unable to Pay for Housing in the Last Year: No     Number of Places Lived in the Last Year: 1     Unstable Housing in the Last Year: No

## 2024-04-26 ENCOUNTER — OFFICE VISIT (OUTPATIENT)
Dept: ORTHOPEDICS | Facility: CLINIC | Age: 54
End: 2024-04-26
Payer: COMMERCIAL

## 2024-04-26 ENCOUNTER — HOSPITAL ENCOUNTER (OUTPATIENT)
Dept: RADIOLOGY | Facility: HOSPITAL | Age: 54
Discharge: HOME | End: 2024-04-26
Attending: ORTHOPAEDIC SURGERY
Payer: COMMERCIAL

## 2024-04-26 VITALS — BODY MASS INDEX: 52.22 KG/M2 | WEIGHT: 266 LBS | HEIGHT: 60 IN

## 2024-04-26 DIAGNOSIS — M25.561 ACUTE PAIN OF BOTH KNEES: ICD-10-CM

## 2024-04-26 DIAGNOSIS — M25.562 ACUTE PAIN OF BOTH KNEES: ICD-10-CM

## 2024-04-26 DIAGNOSIS — M17.0 ARTHRITIS OF BOTH KNEES: Primary | ICD-10-CM

## 2024-04-26 PROCEDURE — 73564 X-RAY EXAM KNEE 4 OR MORE: CPT | Mod: 26,,, | Performed by: RADIOLOGY

## 2024-04-26 PROCEDURE — 99204 OFFICE O/P NEW MOD 45 MIN: CPT | Mod: S$GLB,,, | Performed by: ORTHOPAEDIC SURGERY

## 2024-04-26 PROCEDURE — 99999 PR PBB SHADOW E&M-EST. PATIENT-LVL IV: CPT | Mod: PBBFAC,,, | Performed by: ORTHOPAEDIC SURGERY

## 2024-04-26 PROCEDURE — 1159F MED LIST DOCD IN RCRD: CPT | Mod: CPTII,S$GLB,, | Performed by: ORTHOPAEDIC SURGERY

## 2024-04-26 PROCEDURE — 3008F BODY MASS INDEX DOCD: CPT | Mod: CPTII,S$GLB,, | Performed by: ORTHOPAEDIC SURGERY

## 2024-04-26 PROCEDURE — 73564 X-RAY EXAM KNEE 4 OR MORE: CPT | Mod: TC,50

## 2024-04-26 RX ORDER — MELOXICAM 15 MG/1
15 TABLET ORAL DAILY
Qty: 30 TABLET | Refills: 2 | Status: SHIPPED | OUTPATIENT
Start: 2024-04-26

## 2024-04-30 PROBLEM — M25.562 BILATERAL KNEE PAIN: Status: ACTIVE | Noted: 2024-04-30

## 2024-04-30 PROBLEM — M25.561 BILATERAL KNEE PAIN: Status: ACTIVE | Noted: 2024-04-30

## 2024-04-30 NOTE — PROGRESS NOTES
"[facility]   Physical Therapy Initial Evaluation     Date: 5/1/2024   Name: Chapincito Burns  Clinic Number: [id number]    Therapy Diagnosis:   Encounter Diagnoses   Name Primary?    Chronic pain of both knees Yes    Arthritis of both knees      Physician: Karlo Singh III, *    Physician Orders: PT Eval and Treat /Aquatic Therapy  Medical Diagnosis from Referral: M17.0 (ICD-10-CM) - Arthritis of both knees  Evaluation Date: 5/1/2024  Authorization Period Expiration: 4/26/2025  Plan of Care Expiration: 7/1/2025  Visit # / Visits authorized: 1/ 1 (pending)     Precautions: Standard and Fall    Time In: 7:33 AM  Time Out: 8:31 AM  Total Appointment Time (timed & untimed codes): 57 minutes    SUBJECTIVE   Date of onset: 2019    History of current condition - Chapincito reports: that in 2019 she saw her P.A at the time who said that her knees were "bone on bone", but she didn't have any pain. During Easter of 2024 her niece jumped on her back and her left knee bent accidentally, and the following day her knee was extremely painful. She proceeded to the doctor (Dr. Singh) who told her she should try physical therapy and attempt to loose some weight. She states that over the last year her bilateral knee pain began to increase insidiously. Activities that are aggravating include walking or standing for more than 5 minutes or 50 ft. Patient's left knee is significantly more painful than the right knee, and pain is located along the medial and anteroinferior aspects of both knees.     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     Medical History:   Past Medical History:   Diagnosis Date    AR (allergic rhinitis)     Asthma     with bad sinus infection and when I had covid    History of endometriosis     Hypertension     Overweight(278.02)     Sinusitis     Vitamin D deficiency        Surgical History:   Chapincito Burns  has a past surgical history that includes Pelvic laparoscopy (); Tonsillectomy; Adenoidectomy; Tube thoracotomy; Nose surgery; and Colonoscopy (N/A, 2024).    Medications:   Chapincito Nj has a current medication list which includes the following prescription(s): albuterol, amlodipine, cetirizine, meloxicam, and vitamin d.    Allergies:   Review of patient's allergies indicates:  No Known Allergies     Pool contraindications, including but not limited to, incontinence, seizures, fever/GI issues were reviewed with the patient. Patient agrees that based on their knowledge and medical history, they are appropriate for Aquatic Therapy.     OBJECTIVE     TU seconds (waddling gait pattern)     5 Times Sit to Stand: 14 seconds    Posture Alignment: Left knee brace; increased kyphosis;trunk deviated right;forward head, decreased weight bearing through left lower extremity    GAIT DEVIATIONS: Chapincito Nj displays flexed posturing;antalgic gait;decreased step length;decreased weight shift, decreased bilateral knee extension during terminal stance and walks with bilateral crutches    Range of Motion:   Knee Left active Left Passive   Flexion 88 deg P! 88 deg P!   Extension + 35 deg P! + 32 deg P!     Knee Right active Right Passive   Flexion 107 deg 106 deg P!   Extension + 10 deg + 10 deg P!       Lower Extremity Strength   Right LE  Left LE    Knee extension: 4/5 Knee extension: 4/5   Knee flexion: 4/5 Knee flexion: 4/5   Hip flexion: 3+/5 Hip flexion: 3+/5   Hip extension:  4-/5 Hip extension: 4-/5   Hip " abduction: 4/5 Hip abduction: 4/5   Hip adduction: 4/5 Hip adduction 4/5   Ankle dorsiflexion: 4+/5 Ankle dorsiflexion: 4+/5   Ankle plantarflexion: 5/5 Ankle plantarflexion: 5/5     Special Tests:   Right Left   Valgus Stress Test Negative Positive   Varus Stress test Negative Positive   Lachman's test Negative Negative   Posterior Drawer Negative Negative   Anterior Drawer Negative Negative   Evonne's Test Negative Positive   Apley's Compression Positive Positive     Squat: Positive left knee  Single leg balance: Positive R 2 seconds; L 2 seconds    Joint Mobility:     Patellar sup./inf: Dec. Bilaterally   Patellar med/lat: Lateral glide decreased left knee    Palpation: Right knee medial joint line; left medial/lateral joint line, left patellar tendon, left pes anserine, left distal hamstring attachment    Flexibility:    Chrissie's test: R = + ; L = +   Tal test: R = + ; L = +    TREATMENT     Total Treatment time (time-based codes) separate from Evaluation: 8 minutes      Chapincito received the treatments listed below:      THERAPEUTIC ACTIVITIES to improve dynamic and functional performance for bed mobility, standing and walking for 8 minutes including Bridges, long arc quads (right w/GTB), seated hamstring stretch, seated unilateral clamshells GTB, and seated marching.    PATIENT EDUCATION AND HOME EXERCISES     Education provided:   - Diagnosis, prognosis, relevant anatomy, role of therapy      Written Home Exercises Provided: Yes. Exercises were reviewed and Chapincito was able to demonstrate them prior to the end of the session.  Chapincito demonstrated good  understanding of the education provided. See EMR under Patient Instructions for exercises provided during therapy sessions.    ASSESSMENT     Chapincito Nj is a 53 y.o. female referred to outpatient Physical Therapy with a medical diagnosis of M17.0 (ICD-10-CM) - Arthritis of both knees. Patient presents with ROM, strength and functional limitations that  impact the patient's ability to perform ADLs and preferred activities at prior level of function. She complains of bilateral knee pain that began in 2019 and has worsened over the last year. Patient describes pain as dull and achy along medial and lateral compartments, with left knee being the worst. Physical examination reveals decreased bilateral hip flexion MMT; grossly limited and painful bilateral knee range of motion with greater limitations on the left knee; tenderness to palpation of bilateral medial knee compartments, left pes anserine, and left patellar tendon; an antalgic and flexed gait pattern with decreased bilateral terminal knee extension and impaired left weight shift; TUG and 5X Sit to Stand scores that are not within age limits; and positive bilateral Apley's Compression, left Evonne's, and left Valgus/Varus stress tests. These impairments are resulting in activity limitations with household and community ambulation; prolong standing and work related activities. Given the severity of the patient's knees, her past medical history, weight status, sedentary lifestyle and overall health for her age, a partial recovery is expected within 10-12 weeks. Her rehab potential is dependent on compliance with PT recommendations and adherence to his home exercise program. Skilled PT intervention is required to address these key impairments and to provide and progress with an appropriate home exercise program. This evaluation is of low complexity due to the stable nature of the patients presentation as well as the comorbidities and medical factors included in this evaluation.The patient has been educated in the evaluation findings, prognosis, and plan of care, HEP and is in agreement and willing to participate in therapy.      Patient prognosis is Good.     Patient will benefit from skilled outpatient Physical Therapy to address the deficits stated above and in the chart below, provide patient /family  education, and to maximize patientt's level of independence.     Plan of care discussed with patient: Yes    Patient's spiritual, cultural and educational needs considered and patient is agreeable to the plan of care and goals as stated below:     Anticipated Barriers for therapy: None    Medical Necessity is demonstrated by the following  History  Co-morbidities and personal factors that may impact the plan of care Co-morbidities:   Past Medical History:  Diagnosis Date   AR (allergic rhinitis)    Asthma with bad sinus infection and when I had covid   History of endometriosis    Hypertension    Overweight(278.02)    Sinusitis    Vitamin D deficiency       Personal Factors:   lifestyle     high   Examination  Body Structures and Functions, activity limitations and participation restrictions that may impact the plan of care Body Regions:   lower extremities    Body Systems:    gross symmetry  ROM  strength  balance  gait  transfers    Participation Restrictions:   None    Activity limitations:   Learning and applying knowledge  no deficits    General Tasks and Commands  no deficits    Communication  no deficits    Mobility  walking    Self care  no deficits    Domestic Life  no deficits    Interactions/Relationships  no deficits    Life Areas  no deficits    Community and Social Life  no deficits         low   Clinical Presentation stable and uncomplicated low   Decision Making/ Complexity Score: low       Goals:  Short Term Goals: 6 weeks   1. Patient will be independent in HEP & progressions.  2. Patient will achieve TUG score of 12 sec to demonstrate improved mobility  3. The patient will achieve 5 sit to stand score of 11.4 seconds to demonstrate improved transfers and endurance.     Long Term Goals: 12 weeks   1. The patient will demonstrate independence with extensive HEP.  2. Patient will achieve TUG score of 8  sec to demonstrate improved mobility.  3. Patent is able to demonstrate MMT 4/5 on all lower  quarter MMT without pain reports during testing.      PLAN   Plan of care Certification: 5/1/2024 to 7/1/2025.    Outpatient Physical Therapy 1-2 times weekly for 10-12 weeks to include the following interventions: manual therapy, aquatic therapy, patient education, therapeutic exercise, therapeutic activities.    Patient may be seen by PTA as part of rehabilitation team.    Jennyfer Cedeno, PT      I CERTIFY THE NEED FOR THESE SERVICES FURNISHED UNDER THIS PLAN OF TREATMENT AND WHILE UNDER MY CARE   Physician's comments:     Physician's Signature: ___________________________________________________

## 2024-05-01 ENCOUNTER — TELEPHONE (OUTPATIENT)
Dept: BARIATRICS | Facility: CLINIC | Age: 54
End: 2024-05-01
Payer: COMMERCIAL

## 2024-05-01 ENCOUNTER — CLINICAL SUPPORT (OUTPATIENT)
Dept: REHABILITATION | Facility: HOSPITAL | Age: 54
End: 2024-05-01
Attending: ORTHOPAEDIC SURGERY
Payer: COMMERCIAL

## 2024-05-01 DIAGNOSIS — G89.29 CHRONIC PAIN OF BOTH KNEES: Primary | ICD-10-CM

## 2024-05-01 DIAGNOSIS — M17.0 ARTHRITIS OF BOTH KNEES: ICD-10-CM

## 2024-05-01 DIAGNOSIS — M25.562 CHRONIC PAIN OF BOTH KNEES: Primary | ICD-10-CM

## 2024-05-01 DIAGNOSIS — M25.561 CHRONIC PAIN OF BOTH KNEES: Primary | ICD-10-CM

## 2024-05-01 PROCEDURE — 97161 PT EVAL LOW COMPLEX 20 MIN: CPT

## 2024-05-01 PROCEDURE — 97530 THERAPEUTIC ACTIVITIES: CPT

## 2024-05-01 NOTE — PLAN OF CARE
"[facility]   Physical Therapy Initial Evaluation     Date: 5/1/2024   Name: Chapincito Burns  Clinic Number: [id number]    Therapy Diagnosis:   Encounter Diagnoses   Name Primary?    Chronic pain of both knees Yes    Arthritis of both knees      Physician: Karlo Singh III, *    Physician Orders: PT Eval and Treat /Aquatic Therapy  Medical Diagnosis from Referral: M17.0 (ICD-10-CM) - Arthritis of both knees  Evaluation Date: 5/1/2024  Authorization Period Expiration: 4/26/2025  Plan of Care Expiration: 7/1/2025  Visit # / Visits authorized: 1/ 1 (pending)     Precautions: Standard and Fall    Time In: 7:33 AM  Time Out: 8:31 AM  Total Appointment Time (timed & untimed codes): 57 minutes    SUBJECTIVE   Date of onset: 2019    History of current condition - Chapincito reports: that in 2019 she saw her P.A at the time who said that her knees were "bone on bone", but she didn't have any pain. During Easter of 2024 her niece jumped on her back and her left knee bent accidentally, and the following day her knee was extremely painful. She proceeded to the doctor (Dr. Singh) who told her she should try physical therapy and attempt to loose some weight. She states that over the last year her bilateral knee pain began to increase insidiously. Activities that are aggravating include walking or standing for more than 5 minutes or 50 ft. Patient's left knee is significantly more painful than the right knee, and pain is located along the medial and anteroinferior aspects of both knees.     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     Medical History:   Past Medical History:   Diagnosis Date    AR (allergic rhinitis)     Asthma     with bad sinus infection and when I had covid    History of endometriosis     Hypertension     Overweight(278.02)     Sinusitis     Vitamin D deficiency        Surgical History:   Chapincito Burns  has a past surgical history that includes Pelvic laparoscopy (); Tonsillectomy; Adenoidectomy; Tube thoracotomy; Nose surgery; and Colonoscopy (N/A, 2024).    Medications:   Chapincito Nj has a current medication list which includes the following prescription(s): albuterol, amlodipine, cetirizine, meloxicam, and vitamin d.    Allergies:   Review of patient's allergies indicates:  No Known Allergies     Pool contraindications, including but not limited to, incontinence, seizures, fever/GI issues were reviewed with the patient. Patient agrees that based on their knowledge and medical history, they are appropriate for Aquatic Therapy.     OBJECTIVE     TU seconds (waddling gait pattern)     5 Times Sit to Stand: 14 seconds    Posture Alignment: Left knee brace; increased kyphosis;trunk deviated right;forward head, decreased weight bearing through left lower extremity    GAIT DEVIATIONS: Chapincito Nj displays flexed posturing;antalgic gait;decreased step length;decreased weight shift, decreased bilateral knee extension during terminal stance and walks with bilateral crutches    Range of Motion:   Knee Left active Left Passive   Flexion 88 deg P! 88 deg P!   Extension + 35 deg P! + 32 deg P!     Knee Right active Right Passive   Flexion 107 deg 106 deg P!   Extension + 10 deg + 10 deg P!       Lower Extremity Strength   Right LE  Left LE    Knee extension: 4/5 Knee extension: 4/5   Knee flexion: 4/5 Knee flexion: 4/5   Hip flexion: 3+/5 Hip flexion: 3+/5   Hip extension:  4-/5 Hip extension: 4-/5   Hip " abduction: 4/5 Hip abduction: 4/5   Hip adduction: 4/5 Hip adduction 4/5   Ankle dorsiflexion: 4+/5 Ankle dorsiflexion: 4+/5   Ankle plantarflexion: 5/5 Ankle plantarflexion: 5/5     Special Tests:   Right Left   Valgus Stress Test Negative Positive   Varus Stress test Negative Positive   Lachman's test Negative Negative   Posterior Drawer Negative Negative   Anterior Drawer Negative Negative   Evonne's Test Negative Positive   Apley's Compression Positive Positive     Squat: Positive left knee  Single leg balance: Positive R 2 seconds; L 2 seconds    Joint Mobility:     Patellar sup./inf: Dec. Bilaterally   Patellar med/lat: Lateral glide decreased left knee    Palpation: Right knee medial joint line; left medial/lateral joint line, left patellar tendon, left pes anserine, left distal hamstring attachment    Flexibility:    Chrissie's test: R = + ; L = +   Tal test: R = + ; L = +    TREATMENT     Total Treatment time (time-based codes) separate from Evaluation: 8 minutes      Chapincito received the treatments listed below:      THERAPEUTIC ACTIVITIES to improve dynamic and functional performance for bed mobility, standing and walking for 8 minutes including Bridges, long arc quads (right w/GTB), seated hamstring stretch, seated unilateral clamshells GTB, and seated marching.    PATIENT EDUCATION AND HOME EXERCISES     Education provided:   - Diagnosis, prognosis, relevant anatomy, role of therapy      Written Home Exercises Provided: Yes. Exercises were reviewed and Chapincito was able to demonstrate them prior to the end of the session.  Chapincito demonstrated good  understanding of the education provided. See EMR under Patient Instructions for exercises provided during therapy sessions.    ASSESSMENT     Chapincito Nj is a 53 y.o. female referred to outpatient Physical Therapy with a medical diagnosis of M17.0 (ICD-10-CM) - Arthritis of both knees. Patient presents with ROM, strength and functional limitations that  impact the patient's ability to perform ADLs and preferred activities at prior level of function. She complains of bilateral knee pain that began in 2019 and has worsened over the last year. Patient describes pain as dull and achy along medial and lateral compartments, with left knee being the worst. Physical examination reveals decreased bilateral hip flexion MMT; grossly limited and painful bilateral knee range of motion with greater limitations on the left knee; tenderness to palpation of bilateral medial knee compartments, left pes anserine, and left patellar tendon; an antalgic and flexed gait pattern with decreased bilateral terminal knee extension and impaired left weight shift; TUG and 5X Sit to Stand scores that are not within age limits; and positive bilateral Apley's Compression, left Evonne's, and left Valgus/Varus stress tests. These impairments are resulting in activity limitations with household and community ambulation; prolong standing and work related activities. Given the severity of the patient's knees, her past medical history, weight status, sedentary lifestyle and overall health for her age, a partial recovery is expected within 10-12 weeks. Her rehab potential is dependent on compliance with PT recommendations and adherence to his home exercise program. Skilled PT intervention is required to address these key impairments and to provide and progress with an appropriate home exercise program. This evaluation is of low complexity due to the stable nature of the patients presentation as well as the comorbidities and medical factors included in this evaluation.The patient has been educated in the evaluation findings, prognosis, and plan of care, HEP and is in agreement and willing to participate in therapy.      Patient prognosis is Good.     Patient will benefit from skilled outpatient Physical Therapy to address the deficits stated above and in the chart below, provide patient /family  education, and to maximize patientt's level of independence.     Plan of care discussed with patient: Yes    Patient's spiritual, cultural and educational needs considered and patient is agreeable to the plan of care and goals as stated below:     Anticipated Barriers for therapy: None    Medical Necessity is demonstrated by the following  History  Co-morbidities and personal factors that may impact the plan of care Co-morbidities:   Past Medical History:  Diagnosis Date   AR (allergic rhinitis)    Asthma with bad sinus infection and when I had covid   History of endometriosis    Hypertension    Overweight(278.02)    Sinusitis    Vitamin D deficiency       Personal Factors:   lifestyle     high   Examination  Body Structures and Functions, activity limitations and participation restrictions that may impact the plan of care Body Regions:   lower extremities    Body Systems:    gross symmetry  ROM  strength  balance  gait  transfers    Participation Restrictions:   None    Activity limitations:   Learning and applying knowledge  no deficits    General Tasks and Commands  no deficits    Communication  no deficits    Mobility  walking    Self care  no deficits    Domestic Life  no deficits    Interactions/Relationships  no deficits    Life Areas  no deficits    Community and Social Life  no deficits         low   Clinical Presentation stable and uncomplicated low   Decision Making/ Complexity Score: low       Goals:  Short Term Goals: 6 weeks   1. Patient will be independent in HEP & progressions.  2. Patient will achieve TUG score of 12 sec to demonstrate improved mobility  3. The patient will achieve 5 sit to stand score of 11.4 seconds to demonstrate improved transfers and endurance.     Long Term Goals: 12 weeks   1. The patient will demonstrate independence with extensive HEP.  2. Patient will achieve TUG score of 8  sec to demonstrate improved mobility.  3. Patent is able to demonstrate MMT 4/5 on all lower  quarter MMT without pain reports during testing.      PLAN   Plan of care Certification: 5/1/2024 to 7/1/2025.    Outpatient Physical Therapy 1-2 times weekly for 10-12 weeks to include the following interventions: manual therapy, aquatic therapy, patient education, therapeutic exercise, therapeutic activities.    Patient may be seen by PTA as part of rehabilitation team.    Jennyfer Cedeno, PT      I CERTIFY THE NEED FOR THESE SERVICES FURNISHED UNDER THIS PLAN OF TREATMENT AND WHILE UNDER MY CARE   Physician's comments:     Physician's Signature: ___________________________________________________

## 2024-05-08 ENCOUNTER — CLINICAL SUPPORT (OUTPATIENT)
Dept: REHABILITATION | Facility: HOSPITAL | Age: 54
End: 2024-05-08
Payer: COMMERCIAL

## 2024-05-08 DIAGNOSIS — M25.562 CHRONIC PAIN OF BOTH KNEES: Primary | ICD-10-CM

## 2024-05-08 DIAGNOSIS — G89.29 CHRONIC PAIN OF BOTH KNEES: Primary | ICD-10-CM

## 2024-05-08 DIAGNOSIS — M25.561 CHRONIC PAIN OF BOTH KNEES: Primary | ICD-10-CM

## 2024-05-08 DIAGNOSIS — M17.0 ARTHRITIS OF BOTH KNEES: ICD-10-CM

## 2024-05-08 PROCEDURE — 97113 AQUATIC THERAPY/EXERCISES: CPT | Mod: CQ

## 2024-05-08 NOTE — PROGRESS NOTES
"[facility]   Physical Therapy Treatment Note     Date: 5/1/2024   Name: Chapincito Burns  Clinic Number: [id number]     Therapy Diagnosis:        Encounter Diagnoses   Name Primary?    Chronic pain of both knees Yes    Arthritis of both knees        Physician Orders: PT Eval and Treat /Aquatic Therapy  Medical Diagnosis from Referral: M17.0 (ICD-10-CM) - Arthritis of both knees  Evaluation Date: 5/1/2024  Authorization Period Expiration: 4/26/2025  Plan of Care Expiration: 7/1/2025  Visit # / Visits authorized: eval + 1/ 20   PTA Visit #: 1/5      Time In: 1435  Time Out: 1535  Total Billable Time: 60 minutes    Precautions: Standard and Fall    SUBJECTIVE     Pt reports: left knee pain with end range knee flexion.  Chapincito was compliant with home exercise program.  Response to previous treatment: no adverse effects  Functional change: initiated aquatic     Pain: 4/10  Location: bilateral knee      OBJECTIVE     Objective Measures updated at progress report unless specified.     Treatment     Chapincito received aquatic therapeutic exercises to develop strength, endurance, ROM, flexibility, posture, and core stabilization for 60 minutes including:    FUNCTIONAL MOBILITY TRAINING x 2 laps each at beginning and 1 lap each at end of session  Walk forward/backward/lateral    STRETCHES 2 x 30sec      LE EX x 20  Squat  Heel raise with gluteal set  Hip abduction/adduction  Hip flex/ext    Sit to stand from pool stool    Fwd step ups on 4" step: 2x10/LE  Lateral step ups on 4" step: 2x10/LE    Walking marches x 2 laps    UE EX/CORE  x 20  Shoulder flex/ext TA activation paddles CLOSED  Shoulder horizontal abd/add TA activation paddles CLOSED  Shoulder abd/add with TA activation and paddles CLOSED    Mini squat with push/pull red kickboard  x20    ENDURANCE  LTR x 3'  Bicycle in // bars x 3'      Patient Education and Home Exercises     Home Exercises Provided and Patient Education Provided "     Education provided:   Role of aquatic therapy  Hydration post therapy  Patient was educated on all therapeutic interventions performed during today's treatment visit.     Written Home Exercises Provided: Patient instructed to cont prior HEP. Exercises were reviewed and [unfilled] was able to demonstrate them prior to the end of the session.  [unfilled] demonstrated good  understanding of the education provided. See EMR under Patient Instructions for exercises provided during therapy sessions    ASSESSMENT     Patient required moderate verbal cues for proper technique & core stabilization. Patient did correct technique post instruction. Patient endorses moderate left knee pain with knee flexion. However, pain gradually decreased throughout treatment visit as patient reported during cool down ambulation. Will progress as tolerate.     Pt prognosis is Good.     Pt will continue to benefit from skilled outpatient physical therapy to address the deficits listed in the problem list box on initial evaluation, provide pt/family education and to maximize pt's level of independence in the home and community environment.     Pt's spiritual, cultural and educational needs considered and pt agreeable to plan of care and goals.     Anticipated barriers to physical therapy: none    Goals:   Short Term Goals: 6 weeks   1. Patient will be independent in HEP & progressions.  2. Patient will achieve TUG score of 12 sec to demonstrate improved mobility  3. The patient will achieve 5 sit to stand score of 11.4 seconds to demonstrate improved transfers and endurance.      Long Term Goals: 12 weeks   1. The patient will demonstrate independence with extensive HEP.  2. Patient will achieve TUG score of 8  sec to demonstrate improved mobility.  3. Patent is able to demonstrate MMT 4/5 on all lower quarter MMT without pain reports during testing.      PLAN     Plan of care Certification: 5/1/2024 to 7/1/2025.     Outpatient Physical  Therapy 1-2 times weekly for 10-12 weeks to include the following interventions: manual therapy, aquatic therapy, patient education, therapeutic exercise, therapeutic activities.     Patient may be seen by PTA as part of rehabilitation team.    Bernardo Da Silva, RICH     05/08/2024

## 2024-05-08 NOTE — PROGRESS NOTES
"Subjective     Patient ID: Chapincito Burns is a 53 y.o. female.    Chief Complaint: Consult    CC: weight    New pt to me, referred by Karlo Singh III, MD  [address] [place] [address] , with Patient Active Problem List:     Essential hypertension     Increased risk of breast cancer     Bilateral knee pain       Lab Results       Component                Value               Date                       HGBA1C                   5.4                 08/26/2023                 HGBA1C                   5.4                 06/02/2021                 HGBA1C                   5.6                 11/27/2019            Lab Results       Component                Value               Date                       LDLCALC                  70.2                08/26/2023                 CREATININE               0.9                 08/26/2023                Current attempts at weight loss:  watching her eating. Less sugary drinks. Has started aqua therapy.     Previous diet attempts: [facility]. Water aerobics.     History of medication for loss:   checked today. Either phentermine or Phen-fen. Contrave- had HAs for 4 days, so stopped it.     Heaviest weight:  270#    Lightest weight: 125#    Goal weight: Needs to get TKA, needs to lose 40 lbs. Under 200 #      Last eye exam:    2 years ago. No glaucoma.     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 Does not cook much. Her mom (next door)cooks.   Breakfast: yogurt, granola, banana. May skip Weekends: same.     lunch: Turkey sandwich, wheat thins,  fruit. Weekends: same.     dinner: baked chicken and pot, spaghetti. Pasta with chicken and sausage. Beef stew.     snacks: fruit, wheat thins, cheese.     Beverages: Tea, water, milk, juice. ETOH- 1 drink q few months.     Willingness to change:  7/10    Cardiac studies:     BMR: 1506    PBF:  56.1%      Review of Systems       Objective   BP (!) 165/90   Pulse 104   Ht 4' 11" (1.499 " m)   Wt 119.7 kg (263 lb 14.4 oz)   SpO2 95%   BMI 53.30 kg/m²     Physical Exam  Vitals reviewed.   Constitutional:       General: She is not in acute distress.     Appearance: She is well-developed.   HENT:      Head: Normocephalic and atraumatic.   Eyes:      General: No scleral icterus.     Pupils: Pupils are equal, round, and reactive to light.   Neck:      Thyroid: No thyromegaly.   Cardiovascular:      Rate and Rhythm: Normal rate.      Heart sounds: Normal heart sounds. No murmur heard.     No friction rub. No gallop.   Pulmonary:      Effort: Pulmonary effort is normal. No respiratory distress.      Breath sounds: Normal breath sounds. No wheezing.   Abdominal:      General: Bowel sounds are normal. There is no distension.      Palpations: Abdomen is soft.      Tenderness: There is no abdominal tenderness.   Musculoskeletal:         General: Normal range of motion.      Cervical back: Normal range of motion and neck supple.   Skin:     General: Skin is warm and dry.      Findings: No erythema.   Neurological:      Mental Status: She is alert and oriented to person, place, and time.      Cranial Nerves: No cranial nerve deficit.   Psychiatric:         Behavior: Behavior normal.         Judgment: Judgment normal.            Assessment and Plan     1. Class 3 severe obesity with serious comorbidity and body mass index (BMI) of 50.0 to 59.9 in adult, unspecified obesity type    2. Arthritis of both knees  -     Ambulatory referral/consult to Bariatric/Obesity Medicine    3. Essential hypertension    4. Increased risk of breast cancer    Other orders  -     topiramate (TOPAMAX) 25 MG tablet; Take 1 tablet (25 mg total) by mouth 2 (two) times daily.  Dispense: 180 tablet; Refill: 0      1. Class 3 severe obesity with serious comorbidity and body mass index (BMI) of 50.0 to 59.9 in adult, unspecified obesity type  Medical and surgical options discussed today. Seminar info given.     Patient was informed that  topiramate is used for migraine prevention and seizures. Weight loss is a common side effect that is well documented. S/he understands this. S/he was informed of the potential side effects such as serious and possibly fatal rash in which case the medication should be discontinued immediately. Paresthesias, forgetfulness, fatigue, kidney stones, GI symptoms, and changes in lab values such as electrolytes, blood counts and kidney function.    Start topiramate  in the evening for 1 week, then morning and evening.       Increase low impact activity as tolerated.  Avoid high impact activity, very heavy lifting or other exercise regimens that may cause discomfort.     Add some type of resistance training 2-3 days a week. These can be body weight exercises, light weight or elastic bands. [employer] and [employer] are great sources for free work out plans and videos.     1000 dianna pb meal planner, meal ideas and exercise handouts given    2. Arthritis of both knees  Optimize BMI.   - Ambulatory referral/consult to Bariatric/Obesity Medicine    3. Essential hypertension  The current medical regimen is effective;  continue present plan and medications. Expect improvement with weight loss.     4. Increased risk of breast cancer  Optimize BMI                No follow-ups on file.

## 2024-05-09 ENCOUNTER — OFFICE VISIT (OUTPATIENT)
Dept: BARIATRICS | Facility: CLINIC | Age: 54
End: 2024-05-09
Payer: COMMERCIAL

## 2024-05-09 VITALS
HEART RATE: 104 BPM | WEIGHT: 263.88 LBS | BODY MASS INDEX: 53.2 KG/M2 | DIASTOLIC BLOOD PRESSURE: 90 MMHG | SYSTOLIC BLOOD PRESSURE: 165 MMHG | HEIGHT: 59 IN | OXYGEN SATURATION: 95 %

## 2024-05-09 DIAGNOSIS — M17.0 ARTHRITIS OF BOTH KNEES: ICD-10-CM

## 2024-05-09 DIAGNOSIS — I10 ESSENTIAL HYPERTENSION: ICD-10-CM

## 2024-05-09 DIAGNOSIS — E66.01 CLASS 3 SEVERE OBESITY WITH SERIOUS COMORBIDITY AND BODY MASS INDEX (BMI) OF 50.0 TO 59.9 IN ADULT, UNSPECIFIED OBESITY TYPE: Primary | ICD-10-CM

## 2024-05-09 DIAGNOSIS — Z91.89 INCREASED RISK OF BREAST CANCER: ICD-10-CM

## 2024-05-09 PROCEDURE — 3008F BODY MASS INDEX DOCD: CPT | Mod: CPTII,S$GLB,, | Performed by: INTERNAL MEDICINE

## 2024-05-09 PROCEDURE — 99999 PR PBB SHADOW E&M-EST. PATIENT-LVL V: CPT | Mod: PBBFAC,,, | Performed by: INTERNAL MEDICINE

## 2024-05-09 PROCEDURE — 1159F MED LIST DOCD IN RCRD: CPT | Mod: CPTII,S$GLB,, | Performed by: INTERNAL MEDICINE

## 2024-05-09 PROCEDURE — 99215 OFFICE O/P EST HI 40 MIN: CPT | Mod: S$GLB,,, | Performed by: INTERNAL MEDICINE

## 2024-05-09 PROCEDURE — 3077F SYST BP >= 140 MM HG: CPT | Mod: CPTII,S$GLB,, | Performed by: INTERNAL MEDICINE

## 2024-05-09 PROCEDURE — 3080F DIAST BP >= 90 MM HG: CPT | Mod: CPTII,S$GLB,, | Performed by: INTERNAL MEDICINE

## 2024-05-09 PROCEDURE — 1160F RVW MEDS BY RX/DR IN RCRD: CPT | Mod: CPTII,S$GLB,, | Performed by: INTERNAL MEDICINE

## 2024-05-09 RX ORDER — TOPIRAMATE 25 MG/1
25 TABLET ORAL 2 TIMES DAILY
Qty: 180 TABLET | Refills: 0 | Status: SHIPPED | OUTPATIENT
Start: 2024-05-09 | End: 2025-05-09

## 2024-05-09 NOTE — PATIENT INSTRUCTIONS
If you are interested in bariatric surgery we will need you to  attend an  online seminar. If you choose to view the online seminar please go to: [email or web address] . The seminar is best viewed on a desktop or laptop computer. Upon completion of the seminar on the last slide you will see the code word comprised of letters and numbers in red and you should jot them down as youll need them to enter into the required form. On that same page youll see the link which will take you to the form. Please enter all the information required, including the code word. You will receive an email confirmation and so will we. Upon receiving this letter you will be called so that your appointments can be arranged. There is also two links for you to print out two packets of information. One is the patient information packet and the other is the nutrition worksheet. Please complete them and bring to your next appointment in our department.     Patient was informed that topiramate is used for migraine prevention and seizures. Weight loss is a common side effect that is well documented. S/he understands this. S/he was informed of the potential side effects such as serious and possibly fatal rash in which case the medication should be discontinued immediately. Paresthesias, forgetfulness, fatigue, kidney stones, GI symptoms, and changes in lab values such as electrolytes, blood counts and kidney function.    Start topiramate  in the evening for 1 week, then morning and evening.     Increase low impact activity as tolerated.  Avoid high impact activity, very heavy lifting or other exercise regimens that may cause discomfort.     Add some type of resistance training 2-3 days a week. These can be body weight exercises, light weight or elastic bands. [employer] and [employer] are great sources for free work out plans and videos.                     1000 calorie  Meal Plan  STARCHES 80 CALORIES PER SERVING 15g CARB, 3g PROTEIN, 1g FAT   "Servings per day   bread   tortilla   crackers   cooked cereals   dry cereals   pasta   rice   corn   popcorn   potato (small)   potato, mashed   sweet potato   squash, winter   cooked beans, peas, lentils (add 1 meat exchange)   1 slice   1 (6")   4-6 (3/4 oz)   1/2 cup   3/4 cup   1/2 cup   1/3 cup  1/2 cup   1 small light bag  1 (3 oz)   1/2 cup   1/3 cup   1 cup   1/2 cup Most starches are a good source of B vitamins   Choose whole grain foods such as 100% whole wheat bread and flour, brown rice, tortillas, etc. for nutrients and fiber.   Combine beans (starch & meat) with grains (starch) for their complimentary proteins and fiber   Combine grains (starch) with milk (milk) or cheese (meat) to compliment proteins     2   FRUIT 60 CALORIES PER SERVING 15g CARB    fresh fruit   banana  melon (cubes)   berries  canned fruit   dried fruit    1 small   1/2  ½ cup   ¾ cup  ½ cup   ¼ cup    Choose whole fruits for fiber   No fruit juices   2   DAIRY  CALORIES PER SERVING 12g CARB, 8g PROTEIN, 0-8g FAT    milk   yogurt  Protein soy or almond milk 1 cup   1 cup   1 cup Use unsweetened almond or soy milk with added protein  Avoid chocolate or flavored milk  Avoid yogurt with more than 8 gms of sugar. Gms of protein should be higher than grams of sugar               1   MEAT AND SUBSTITUES  CALORIES PER SERVING 7g Protein, 0-13g FAT    Meat,Seafood and fish   cheese   cottage cheese   egg   peanut butter   tofu or tempeh  cooked beans, peas, lentils (add 1 starch)  Quinoa (add 1 starch)   Nuts and seeds (½ serving protein + 1 fat)  Nutritional yeast  Morning Star grillers 1 oz       1 oz  1/4 cup     1   1.5 Tbsp   4 oz (1/2 cup)   1/2 cup              ¼ cup            2 tablespoons    1 janett or ½ cup      Choose fish, seafood and lower fat cheeses  Limit frying or adding fat.      8   FATS 45 CALORIES PER SERVING     oil   mayonnaise   cream cheese   salad dressing   peanuts   avocado   butter or margarine    " 1 tsp   1 tsp   1 Tbsp   1 Tbsp   10   1/8   1 tsp Eat less fat.   Eat less saturated fat such as animal fat found in fatter meat, cheese, and butter. Also eat less hydrogenated fat.      2-3   VEGETABLES 25 CALORIES PER SERVING 5 g CARB, 2g PROTEIN    raw vegetables   cooked vegetables   tomato or vegetable juice 1 cup   1/2 cup     1/2 cup Choose dark green leafy and deep yellow vegetables such as spinach, zuchinni, squash, mushrooms, cauliflower ,broccoli, carrots, and peppers.   Unlimited        1000 CALORIE MEAL PLAN  Eat 3 small meals per day with 1-2 small snacks to keep you full throughout the day  Aim for at least 15 gms of protein at breakfast, at least 25 grams at lunch and at least 25 grams of protein at dinner.  Snacks should contain at least 5 grams of protein  Limit starches to 1 serving per meal or snack.  Keep your carbs whole grain or whole wheat  Limit your intake of refined sugar including sugary beverages ie sweet tea, lemonade, fruit punch             Fruit Protein Dairy Starch Fats Calories   Breakfast 1 2    370   Lunch  3  1 1 290   Dinner 1 3  1 1 315   Snack   1   120   Total 2 8 1 2 2 1085     Sample breakfasts:  2 scrambled eggs (use spray), 1 cup Protein Silk soy milk, 1 slice whole wheat toast, 1 small orange  Omelet made with 1 egg, 1-ounce low fat cheese and non-starchy veggies, 1 low fat plain yogurt with ½ banana  Plain yogurt with ¾ cup unsweetened cold cereal and ½ cup fresh fruit salad, 2 hardboiled eggs  Sample lunches:  ½ whole wheat bagel topped with 3 ounces of low fat cheese melted in oven with a wild green salad with 1 TBSP dressing  ¾ cup cooked beans with 6 whole grain crackers, 10 roasted peanuts  ½ medium baked potato with 3 ounces of low fat cheddar cheese and 1 TBSP  sour cream  1 small wheat tortilla brushed with 1 tsp olive oil then brushed with pizza sauce and 4 ounces low fat cheese, sliced mushrooms and green peppers broiled until cheese is melted.  ½ cup  chopped melon    Sample Dinners:  4 ounces of tuna pan seared in 1 tsp olive oil, ½ baked small sweet potato and 2 small plums  ½ cup chick peas, 1 cup cauliflower sauteed with 1 tbsp chopped onion, 1 tsp oil, and 2 tsp davidson powder. Add low sodium vegetable stock to desired consistency. Serve with ½ cup brown rice  Red beans seasoned with onions and bell peppers served over cauliflower rice  1 cup cooked green or brown lentils served with ½ cup cooked quinoa and chopped tomatoes and cucumbers and 1 tbsp feta cheese  Sample Snacks:  1 cup of Protein Silk Soy milk with 3 squares elisha crackers  3/4 ounce Triscuits with 1 ounce cubed cheese  Chobani Triple Zero yogurt with ¾ cup unsweetened cereal  ½ cup edamame (shelled)      Meal Ideas for Regular Bariatric Diet  *Recipes and products available at [email or web address]      Breakfast: (15-20g protein)    - Egg white omelet: 2 egg whites or ½ cup Egg Beaters. (Optional proteins: cheese, shrimp, black beans, chicken, sliced turkey) (Optional veggies: tomatoes, salsa, spinach, mushrooms, onions, green peppers, or small slice avocado)     - Egg and sausage: 1 egg or ¼ cup Egg Beaters (any variety), with 1 janett or 2 links of Turkey sausage or Veggie breakfast sausage ([employer] or "[employer]    - Crust-less breakfast quiche: To make a glass pie dish, mix 4oz part skim Ricotta, 1 cup skim milk, and 2 eggs as your base. Add protein: shredded cheese, sliced lean ham or turkey, turkey best/sausage. Add veggies: tomato, onion, green onion, mushroom, green pepper, spinach, etc.    - Yogurt parfait: Mix 1 - 6oz container Dannon Light N Fit vanilla yogurt, with ¼ cup Kashi Go Lean cereal    - Cottage cheese and fruit: ½ cup part-skim cottage cheese or ricotta cheese topped with fresh fruit or sugar free preserves     - Melva Barrera's Vanilla Egg custard* (add 2 Tbsp instant coffee granules to make Cappuccino Custard*)    - Hi-Protein café latte (skim milk, decaf coffee,  1 scoop protein powder). Optional to add Sugar free syrup or extract flavoring.    Lunch: (20-30g protein)    - ½ cup Black bean soup (Homemade or Progresso), with ¼ cup shredded low-fat cheese. Top with chopped tomato or fresh salsa.     - Lean deli turkey breast and low-fat sliced cheese, mustard or light gill to moisten, rolled up together, or wrapped in a Shayan lettuce leaf    - Chicken salad made from dinner leftovers, moisten with low-fat salad dressing or light gill. Also try leftover salmon, shrimp, tuna or boiled eggs. Serve ½ cup over dark green salad    - Fat-free canned refried beans, topped with ¼ cup shredded low-fat cheese. Top with chopped tomato or fresh salsa.     - Greek salad: Top mixed greens with 1-2oz grilled chicken, tomatoes, red onions, 2-3 kalamata olives, and sprinkle lightly with feta cheese. Spritz with Balsamic vinegar to taste.     - Crust-less lunch quiche: To make a glass pie dish, mix 4oz part skim Ricotta, 1 cup skim milk, and 2 eggs as your base. Add protein: shredded cheese, sliced lean ham or turkey, shrimp, chicken. Add veggies: tomato, onion, green onion, mushroom, green pepper, spinach, artichoke, broccoli, etc.    - Pizza bake: tomato sauce, low-fat shredded mozzarella and turkey pepperoni or [place] best. Add any veggies.    - Cucumber crab bites: Spread ¼ cup crab dip (lump crabmeat + light cream cheese and green onions) over sliced cucumber.     - Chicken with light spinach and artichoke dip*: Puree in : 6oz cooked and drained spinach, 2 cloves garlic, 1 can cannelloni beans, ½ cup chopped green onions, 1 can drained artichoke hearts (not marinated in oil), lemon juice and basil. Mix in 2oz chopped up chicken.    Supper: (20-30g protein)    - Serve grilled fish over dark green salad tossed with low-fat dressing, served with grilled asparagus hickman     - Rotisserie chicken salad: served with sliced strawberries, walnuts, fat-free feta cheese crumbles  and 1 tbsp Hernandezs Own Light Raspberry [place] Vinaigrette    - Shrimp cocktail: Dip cold boiled shrimp in homemade low-sugar cocktail sauce (1/2 cup Gulshan One Carb ketchup, 2 tbsp horseradish, 1/4 tsp hot sauce, 1 tsp Worcestershire sauce, 1 tbsp freshly-squeezed lemon juice). Serve with dark green salad, walnuts, and crumbled blue cheese drizzled with olive oil and Balsamic vinegar    - Tuna Melt: Spread tuna salad onto 2 thick slices of tomato. Top with low-fat cheese and broil until cheese is melted. May also be made with chicken salad of shrimp salad. [place] with different types of cheeses.    - Homemade low-fat Chili using extra lean ground beef or ground turkey. Top with shredded cheese and salsa as desired. May add dollop fat-free sour cream if desired    - Dinner Omelet with shrimp or chicken and onion, green peppers and chives.    - No noodle lasagna: Use sliced zucchini or eggplant in place of noodles.  Layer with part skim ricotta cheese and low sugar meat sauce (use very lean ground beef or ground turkey).    - Mexican chicken bake: Bake chunks of chicken breast or thigh with taco seasoning, Pace brand enchilada sauce, green onions and low-fat cheese. Serve with ¼ cup black beans or fat free refried beans topped with chopped tomatoes or salsa.    - Annette frozen meatballs, simmered in Classico Marinara sauce. Different flavors of salsa or spaghetti sauce create different dishes! Sprinkle with parmesan cheese. Serve with grilled or steamed veggies, or a dark green salad.    - Simmer boneless skinless chicken thigh chunks in Classico Marinara sauce or roasted salsa until tender with chopped onion, bell pepper, garlic, mushrooms, spinach, etc.     - Hamburger, without the bun, dressed the way you like. Served with grilled or steamed veggies.    - Eggplant parmesan: Bake slices of eggplant at 350 degrees for 15 minutes. Layer tomato sauce, sliced eggplant and low-fat mozzarella cheese in a baking  dish and cover with foil. Bake 30-40 more minutes or until bubbly. Uncover and bake at 400 degrees for about 15 more minutes, or until top is slightly crisp.    - Fish tacos: grilled/baked white fish, wrapped in Shayan lettuce leaf, topped with salsa, shredded low-fat cheese, and light coleslaw.    Snacks: (100-200 calories; >5g protein)    - 1 low-fat cheese stick with 8 cherry tomatoes or 1 serving fresh fruit  - 4 thin slices fat-free turkey breast and 1 slice low-fat cheese  - 4 thin slices fat-free honey ham with wedge of melon  - 1/4 cup unsalted nuts with ½ cup fruit  - 6-oz container Dannon Light n Fit vanilla yogurt, topped with 1oz unsalted nuts         - apple, celery or baby carrots spread with 2 Tbsp natural peanut butter or almond butter   - apple slices with 1 oz slice low-fat cheese  - celery, cucumber, bell pepper or baby carrots dipped in ¼ cup hummus bean spread or light spinach and artichoke dip (*recipe in lunch section)  - 100 calorie bag microwave light popcorn with 3 tbsp grated parmesan cheese  - Art Links Beef Steak - 14g protein! (similar to beef jerky)  - 2 wedges Laughing Cow - Light Herb & Garlic Cheese with sliced cucumber or green bell pepper  - 1/2 cup low-fat cottage cheese with ¼ cup fruit or ¼ cup salsa  - RTD Protein drinks: Atkins, Low Carb Slim Fast, EAS light, Muscle Milk Light, etc.  - Homemade Protein drinks: [facility] Soy95, Isopure, Nectar, UNJURY, Whey Gourmet, etc. Mix 1 scoop powder with 8oz skim/1% milk or light soymilk.  - Protein bars: Atkins, EAS, Pure Protein, Think Thin, Detour, etc. Must have 0-4 grams sugar - Read the label.    Takeout Options: No more than twice/week  Deli - Salads (no pasta or rice), meats, cheeses. Roasted chicken. Lox (salmon)    Mexican - Platters which don't include tortillas, chips, or rice. Go easy on the beans. Example: Fajitas without the tortillas. Ask the  not to bring chips to the table if they are too tempting.    Greek - Meat  or fish and vegetable, but no bread or rice. Including hummus, baba ganoush, etc, is OK. Most sit-down Greek restaurants can provide you with cucumber slices for dipping instead of isamar bread.    Fast Food (Avoid as much as possible) - Salads (no croutons and limit salad dressing to 2 tbsp), grilled chicken sandwich without the bun and ask for no gill. Anns low fat chili or Taco Bell pintos and cheese.    BBQ - The meats are fine if you ask for sauces on the side, but most of the traditional side dishes are loaded with carbs. Eloy slaw, baked beans and BBQ sauce are typically made with sugar.    Chinese - Nothing deep-fried, no rice or noodles. Many Chinese sauces have starch and sugar in them, so you'll have to use your judgement. If you find that these sauces trigger cravings, or cause Dumping, you can ask for the sauce to be made without sugar or just use soy sauce.    SEATED RESISTANCE BAND EXERCISES     If you do not have a resistance band, or do not feel comfortable using a resistance band, these exercises can also be done holding a light hand weight or water bottle.  If you are just starting to exercise, you may want to go through the motions without any weights or resistance till you become comfortable with the movements.     Do each of the movements shown 10 times (10 repetitions). You can repeat the exercises a second or  third time as well for greater benefit. The amount of tension on the resistance bands should be adjusted so  you can complete one set of 10 repetitions with effort. Increase the tension every few weeks. Do these  exercises 2 or 3 times a week.     * If an exercise hurts your back or joints, stop doing that particular exercise, but keep doing all the others *        CHEST EXERCISE          Start Position:   Sit tall, with feet shoulder width apart and feet in front of knees.   Belly pulled in.   Place the band around your upper back, grasp band in each hand, knuckles (rings) facing  front. Arms  should be bent so that knuckles are in front of elbows   Slide shoulder blades down your back and slightly together (as if making a V).   Relax your neck.     To Perform This Exercise:   Press hands forward to lengthen arms using chest muscles (not arms!).   Dont arch your back!)   Return to start position and repeat 10 times.   Breathe!           BACK EXERCISE          Start Position:   Sit tall, with feet shoulder width apart and feet in front of knees.   Belly pulled in.   Grasp band in each hand and raise overhead. Arms should be slightly wider than shoulder width and no  slack in the band.   Slide shoulder blades down your back and slightly together (as if making a V).   Relax your neck.     To Perform This Exercise:   Open arms pulling down towards chest using upper back muscles (not arms!).   Squeeze through shoulder blades at the bottom of the movement (dont arch your back!).   Return to start position and repeat 10 times.   Breathe!           SHOULDER EXERCISE          Start Position:   Sit tall, with feet shoulder width apart and feet in front of knees.   Belly pulled in.   Sit on band so that you can grasp band in one hand with tension on the band, but not so much tension that  you cannot straighten the arm. It may take a few tries to find the right amount of tension.   Slide shoulder blades down your back and slightly together (as if making a V).   Relax your neck.     To Perform This Exercise:   Press fist to the ceiling, slightly in front of the body.   SLOWLY return to start position and repeat 10 times.   Switch sides and repeat on the other side.   Breathe!           TRICEPS EXERCISE          Start Position:   Sit tall, with feet shoulder width apart and feet in front of knees.   Belly pulled in.   Sit on one end of the band. Grasp other end of band in one hand.   Point elbow directly toward the ceiling (if this is difficult, you may support the upper arm with the  opposite  hand)   Be sure there is no slack in the band in the starting position.   Slide shoulder blades down your back and slightly together (as if making a V).   Relax your neck.     To Perform This Exercise:   Extend your arm up to the ceiling, as shown.   Squeeze through shoulder blades at the bottom of the movement (dont arch your back!).   SLOWLY return to start position and repeat 10 times.   Repeat on the other side.   Breathe!           BICEP EXERCISE          Start Position:   Sit tall, with feet shoulder width apart and feet in front of knees.   Belly pulled in.   Place center of exercise band under one foot and step the end of the band under the other foot.   Grasp each end of the band with one hand. Make sure there is no slack between your foot and the hand  that holds the band.   Hold your elbows to your sides.   Pull abdominals in, lift chest, press shoulders down and back.     To Perform This Exercise:   As you curl up, keep your wrist from changing position in relation to your forearm and your arm stable  from the shoulder to the elbow.   Bend and straighten your elbow in a slow and controlled movement. Repeat this motion 10 times.   Repeat on the other side.   Breathe!

## 2024-05-13 NOTE — PROGRESS NOTES
Subjective:     Chapincito Burns     Chief Complaint   Patient presents with    Right Knee - Pain    Left Knee - Pain     HPI    Chapincito is a 53 y.o. female coming in today for bilateral knee pain that began about 5 year(s) ago, referred by Dr. Singh. Pt. describes the pain as a 3/10 achy pain that does not radiate. There was not a fall/injury/ or trauma associated with the onset of symptoms. The pain is better with rest, meloxicam, hinged knee brace (left) and worse with standing for long periods, sitting or standing with legs straight. Pt has tried OTC Aleve and voltaren gel with minimal relief. She is currently taking meloxicam with some relief, but having more pain toward the end of the day. Pt started aquatic PT last Wednesday. Pt. Denies any other musculoskeletal complaints at this time.     Joint instability? yes  Mechanical locking/clicking? yes  Affecting ADL's? yes  Affecting sleep? 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    PAST MEDICAL HISTORY:   Past Medical History:   Diagnosis Date    AR (allergic rhinitis)     Asthma     with bad sinus infection and when I had covid    History of endometriosis     Hypertension     Overweight(278.02)     Sinusitis     Vitamin D deficiency      PAST SURGICAL HISTORY:   Past Surgical History:   Procedure Laterality Date    ADENOIDECTOMY      COLONOSCOPY N/A 4/25/2024    Procedure: COLONOSCOPY;  Surgeon: Murphy Cruz MD;  Location: [facility] ([address] [place] [address]);  Service: Colon and Rectal;  Laterality: N/A;  BMI: 53  Referral:  Jessica Leyva PA-C  PEG  Inst portal  LW  4/3-precall complete-MS    NOSE SURGERY      nasal passage from hole d/t tooth removal at dentist    PELVIC LAPAROSCOPY  2002    CUROLE    TONSILLECTOMY      TUBE THORACOTOMY       FAMILY HISTORY:   Family History   Problem Relation Name Age of Onset    Breast cancer Mother Lula 68        ER/IN+ DCIS s/p mastectomy. No genetic testing.    Asthma Father Luis Manuel     Lung  disease Father Luis Manuel         s/p lobectomy for cancer vs precancer; former smoker    Breast cancer Maternal Grandmother  60    Diabetes Paternal Grandmother      Prostate cancer Paternal Grandfather      Breast cancer Maternal Aunt Isidra 49        contralateral at 60, no genetic testing    Breast cancer Maternal Aunt Nataly (-) 73        Negative genetic testing    Prostate cancer Maternal Uncle David     Vaginal cancer Paternal Aunt Dacia     Liver cancer Paternal Aunt Siobhan     Other Paternal Aunt Isidra         brain?    Colon cancer Neg Hx      Ovarian cancer Neg Hx      Stroke Neg Hx      Hypertension Neg Hx       SOCIAL HISTORY:   Social History     Socioeconomic History    Marital status: Single   Occupational History     Comment: teacher   Tobacco Use    Smoking status: Never     Passive exposure: Never    Smokeless tobacco: Never   Substance and Sexual Activity    Alcohol use: No    Drug use: No    Sexual activity: Yes     Partners: Male     Birth control/protection: Injection     Social Determinants of Health     Financial Resource Strain: Low Risk  (1/2/2024)    Overall Financial Resource Strain (CARDIA)     Difficulty of Paying Living Expenses: Not very hard   Food Insecurity: No Food Insecurity (1/2/2024)    Hunger Vital Sign     Worried About Running Out of Food in the Last Year: Never true     Ran Out of Food in the Last Year: Never true   Transportation Needs: No Transportation Needs (1/2/2024)    PRAPARE - Transportation     Lack of Transportation (Medical): No     Lack of Transportation (Non-Medical): No   Physical Activity: Insufficiently Active (1/2/2024)    Exercise Vital Sign     Days of Exercise per Week: 3 days     Minutes of Exercise per Session: 10 min   Stress: No Stress Concern Present (1/2/2024)    [employer] [employer] [place] [employer] - Occupational Stress Questionnaire     Feeling of Stress : Only a little   Housing Stability: Low Risk  (1/2/2024)    Housing Stability Vital  Sign     Unable to Pay for Housing in the Last Year: No     Number of Places Lived in the Last Year: 1     Unstable Housing in the Last Year: No     MEDICATIONS:     Current Outpatient Medications:     amLODIPine (NORVASC) 10 MG tablet, Take 1 tablet (10 mg total) by mouth once daily., Disp: 90 tablet, Rfl: 3    cetirizine (ZYRTEC) 10 MG tablet, Take 10 mg by mouth once daily., Disp: , Rfl:     meloxicam (MOBIC) 15 MG tablet, Take 1 tablet (15 mg total) by mouth once daily. Take with food, breakfast or dinner. Additional refills should come from primary care provider, Disp: 30 tablet, Rfl: 2    topiramate (TOPAMAX) 25 MG tablet, Take 1 tablet (25 mg total) by mouth 2 (two) times daily., Disp: 180 tablet, Rfl: 0    vitamin D (VITAMIN D3) 1000 units Tab, Take 1,000 Units by mouth once daily., Disp: , Rfl:     albuterol (VENTOLIN HFA) 90 mcg/actuation inhaler, Inhale 1-2 puffs into the lungs every 6 (six) hours as needed for Wheezing or Shortness of Breath. Rescue (Patient not taking: Reported on 2024), Disp: 18 g, Rfl: 0  No current facility-administered medications for this visit.  ALLERGIES:   Review of patient's allergies indicates:  No Known Allergies    Reviewed office visit on 24 with Dr. Singh: B knee Klg4 varus knee arthritis, severe bone on bone   Upon reviewing the x-ray results, it was determined that surgical intervention would be the most suitable long-term solution for her condition. Nonoperative treatment approach will be initiated to alleviate her symptoms. Additionally, weight loss will be considered as a viable option.   Ref:   -non op sports med for US guided injections (CSI, HA), non-op management  -bariatric medical and surgical eval     Goal BMI <45 = 230lbs, 266 today = 36 lbs  F/u PRN    IMAGIN. X-ray ordered due to bilateral knee pain. (AP bilateral standing, PA bilateral standing in flexion, bilateral merchants, and  bilateral lateral views) taken 24.   2. X-ray images were  "reviewed personally by me and then directly with patient.  3. FINDINGS: Skeletal structures at the knees are intact. Degenerative joint disease is again identified with marginal spurring at multiple positions and severe narrowing of bilateral medial tibiofemoral joint spaces with bone-on-bone on the right. Left patellofemoral joint space is severely narrowed also. No significant joint effusion is detected.   4. IMPRESSION: Kellgren-Priyank grade 4 OA bilaterally      Objective:     VITAL SIGNS: /84   Pulse (!) 113   Ht 4' 11" (1.499 m)   Wt 119.7 kg (263 lb 14.3 oz)   BMI 53.30 kg/m²    General    Vitals reviewed.  Constitutional: She is oriented to person, place, and time. She appears well-developed and well-nourished.   Neurological: She is alert and oriented to person, place, and time.   Psychiatric: She has a normal mood and affect. Her behavior is normal.           MUSCULOSKELETAL EXAM    BILATERAL KNEE EXAMINATION   Affected side is compared to contralateral knee     Observation:  No edema, erythema, ecchymosis, or effusion noted.  No muscle atrophy of the thighs and calves noted.  No obvious bony deformities noted.   No Genu valgus/varum noted.  No recurvatum noted.    No tibial internal/external torsion.    Posture:  Upright and Posterior pelvis tilt with loss of lumbar lordosis  Gait: Left antalgic with Neutral ankle mechanics and Neutral medial arch    Tenderness:  Patella - none    Lateral joint line - + bilaterally  Quad tendon - none   Medial joint line - none  Patellar tendon - none  Medial plica - none  Tibial tubercle - none   Lateral plica - none  Pes anserine - none   MCL prox - none  Distal ITB - none   MCL distal - none  MFC - none    LCL prox - none  LFC - none    LCL distal - none  Tibia - none    Fibula - none    No obvious bursae, plicae, popliteal cysts, or tendon derangement palpated.          ROM (* = with pain):   Active extension to 0° on left without hyperextension, lag, " crepitus, or patellar J sign.   Active extension to 0° on right without hyperextension, lag, crepitus, or patellar J sign.  Active flexion to 90° on left* and 120° on right    Strength(* = with pain):  Knee Flexion - 5/5 on left and 5/5 on right  Knee Extension - 5/5 on left and 5/5 on right  Hip Flexion - 5/5 on left and 5/5 on right  Hip Extension - 5/5 on left and 5/5 on right  Ankle dorsiflexion - 5/5 on left and 5/5 on right  Ankle Plantarflexion - 5/5 on left and 5/5 on right    Patellofemoral Exam:   Patellar ballottement - negative  Bulge sign - negative  Patellar grind - negative    No patellar laxity with medial and lateral translation   No apprehension with medial and lateral patellar translation.     Meniscus Testing:     + pain with terminal extension and flexion at joint lines.    Ligament Testing:  Lachman's test - negative  No laxity with anterior drawer.  No laxity with posterior drawer.    No laxity with varus testing at 0 and 30 degrees.  No laxity with valgus testing at 0 and 30 degrees.    IT band testing:  Noble Compression test - negative    Neurovascular Examination:   Sensation intact to light touch in the obturator, lateral/intermediate/medial/posterior femoral cutaneous, saphenous, and common peroneal nerves bilaterally.  Motor Function:    Fully intact motor function at hip, knee, foot and ankle.  Negative seated straight leg raise bilaterally.    Pulses intact at the DP and PT arteries bilaterally.    Capillary refill intact <2 seconds in all toes bilaterally.    Large Joint Aspiration/Injection  Knee joint, bilateral    Performed by: EULALIO WINSTON  Authorized by: EULALIO WINSTON  Consent Done?: Yes (Verbal)  Indications: Pain  Site marked: The procedure site was marked   Timeout: Prior to procedure the correct patient, procedure, and site was verified     Location: Knee joint, bilateral  Prep: Patient was prepped with Chlorhexidine and alcohol.  Skin anesthetic: Ethyl Chloride  "spray was used prior to skin puncture.  Ultrasound Guidance for needle placement: yes  Procedure: After local anesthetic was applied, the 22G, 3.5 needle was used on the left, and a 22 G 2.5 " needle was used to enter the right knee joint capsule under US guidance. A 3 cc mixture of 1 cc of 40 mg/ml triamcinolone acetonide and 2 cc of 0.2% Naropin was injected into each knee joint.   Approach: superolateral  Medications: 40 mg triamcinolone acetonide 40 mg/mL  Patient tolerance: Patient tolerated the procedure well with no immediate complications    Ultrasound guidance was used for needle localization with [employer] Edge 2, 9-L MHz linear probe(s). Images were saved and stored for documentation. The knee joint was visualized. Short and long axis images of each anterior knee were taken prior to injection.Dynamic visualization of the needle(s) was continuous throughout the procedure and maintained good position and correct needle placement.      Triamcinolone:  NDC: [address]-4  LOT: [id number]  EXP: 01/2026    Assessment:      Encounter Diagnosis   Name Primary?    Primary osteoarthritis of knees, bilateral Yes        Plan:   1. Bilateral knee pain secondary to severe DJD changes as noted on x-ray.   - Discussed conservative therapy of OA with continued aqua therapy (avoiding 2-3 days following injections), injection therapy (corticosteroid, viscosupplementation, and PRP), Ice up to 20 minutes at a time, and NSAIDs for breakthrough pain. Pt. Would proceed with bilateral corticosteroid injections today.  - Patient received an ultrasound guided corticosteroid injection of the bilateral knees today (see details above).   - continue weight loss efforts to obtain BMI goals for TKA surgery with Dr. Singh   -  X-ray images of bilateral knee taken 4/26/24 (AP bilateral standing, PA bilateral standing in flexion, bilateral merchants, and bilateral lateral views) showed Kellgren-Priyank grade 4 OA bilaterally. Images were " personally reviewed with patient.    2.  Follow-up as needed if pain deteriorates or new issue arises.  Patient instructed to message via the patient portal in 2 weeks to determine relief, if any from today's bilateral corticosteroid injections.     3. Patient agreeable to today's plan and all questions were answered    This note is dictated using the M*Modal Fluency Direct word recognition program. There are word recognition mistakes that are occasionally missed on review.

## 2024-05-14 ENCOUNTER — OFFICE VISIT (OUTPATIENT)
Dept: SPORTS MEDICINE | Facility: CLINIC | Age: 54
End: 2024-05-14
Payer: COMMERCIAL

## 2024-05-14 VITALS
HEIGHT: 59 IN | WEIGHT: 263.88 LBS | DIASTOLIC BLOOD PRESSURE: 84 MMHG | HEART RATE: 113 BPM | BODY MASS INDEX: 53.2 KG/M2 | SYSTOLIC BLOOD PRESSURE: 139 MMHG

## 2024-05-14 DIAGNOSIS — M17.0 PRIMARY OSTEOARTHRITIS OF KNEES, BILATERAL: Primary | ICD-10-CM

## 2024-05-14 PROCEDURE — 20611 DRAIN/INJ JOINT/BURSA W/US: CPT | Mod: 50,S$GLB,, | Performed by: NEUROMUSCULOSKELETAL MEDICINE & OMM

## 2024-05-14 PROCEDURE — 3079F DIAST BP 80-89 MM HG: CPT | Mod: CPTII,S$GLB,, | Performed by: NEUROMUSCULOSKELETAL MEDICINE & OMM

## 2024-05-14 PROCEDURE — 3075F SYST BP GE 130 - 139MM HG: CPT | Mod: CPTII,S$GLB,, | Performed by: NEUROMUSCULOSKELETAL MEDICINE & OMM

## 2024-05-14 PROCEDURE — 99204 OFFICE O/P NEW MOD 45 MIN: CPT | Mod: 25,S$GLB,, | Performed by: NEUROMUSCULOSKELETAL MEDICINE & OMM

## 2024-05-14 PROCEDURE — 1159F MED LIST DOCD IN RCRD: CPT | Mod: CPTII,S$GLB,, | Performed by: NEUROMUSCULOSKELETAL MEDICINE & OMM

## 2024-05-14 PROCEDURE — 3008F BODY MASS INDEX DOCD: CPT | Mod: CPTII,S$GLB,, | Performed by: NEUROMUSCULOSKELETAL MEDICINE & OMM

## 2024-05-14 PROCEDURE — 1160F RVW MEDS BY RX/DR IN RCRD: CPT | Mod: CPTII,S$GLB,, | Performed by: NEUROMUSCULOSKELETAL MEDICINE & OMM

## 2024-05-14 PROCEDURE — 99999 PR PBB SHADOW E&M-EST. PATIENT-LVL III: CPT | Mod: PBBFAC,,, | Performed by: NEUROMUSCULOSKELETAL MEDICINE & OMM

## 2024-05-14 RX ORDER — TRIAMCINOLONE ACETONIDE 40 MG/ML
40 INJECTION, SUSPENSION INTRA-ARTICULAR; INTRAMUSCULAR
Status: COMPLETED | OUTPATIENT
Start: 2024-05-14 | End: 2024-05-14

## 2024-05-14 RX ADMIN — TRIAMCINOLONE ACETONIDE 40 MG: 40 INJECTION, SUSPENSION INTRA-ARTICULAR; INTRAMUSCULAR at 10:05

## 2024-05-22 ENCOUNTER — CLINICAL SUPPORT (OUTPATIENT)
Dept: REHABILITATION | Facility: HOSPITAL | Age: 54
End: 2024-05-22
Payer: COMMERCIAL

## 2024-05-22 DIAGNOSIS — M25.562 CHRONIC PAIN OF BOTH KNEES: Primary | ICD-10-CM

## 2024-05-22 DIAGNOSIS — G89.29 CHRONIC PAIN OF BOTH KNEES: Primary | ICD-10-CM

## 2024-05-22 DIAGNOSIS — M25.561 CHRONIC PAIN OF BOTH KNEES: Primary | ICD-10-CM

## 2024-05-22 PROCEDURE — 97113 AQUATIC THERAPY/EXERCISES: CPT

## 2024-05-22 NOTE — PROGRESS NOTES
"[facility]   Physical Therapy Treatment Note     Date: 5/1/2024   Name: Chapincito Burns  Clinic Number: [id number]     Therapy Diagnosis:        Encounter Diagnoses   Name Primary?    Chronic pain of both knees Yes    Arthritis of both knees        Physician Orders: PT Eval and Treat /Aquatic Therapy  Medical Diagnosis from Referral: M17.0 (ICD-10-CM) - Arthritis of both knees  Evaluation Date: 5/1/2024  Authorization Period Expiration: 4/26/2025  Plan of Care Expiration: 7/1/2025  Visit # / Visits authorized: 3/ 20   PTA Visit #: 1/5      Time In: 2:18 PM  Time Out: 3:15 PM  Total Billable Time: 30 minutes    Precautions: Standard and Fall    SUBJECTIVE     Pt reports: That her knees feel much better after getting injections last week. She is able to bend and extend her knee with less pain provocation.    Chapincito was compliant with home exercise program.  Response to previous treatment: no adverse effects  Functional change: initiated aquatic     Pain: 3/10  Location: bilateral knee      OBJECTIVE     Objective Measures updated at progress report unless specified.     Treatment     Chapincito received aquatic therapeutic exercises to develop strength, endurance, ROM, flexibility, posture, and core stabilization for 56 minutes including:    FUNCTIONAL MOBILITY TRAINING x 2 laps each at beginning and 1 lap each at end of session  Walk forward/backward/lateral    STRETCHES 2 x 30sec  + Calf stretch  + Quad stretch at stool    LE EX x 20 w/ RTB  Squat  Heel raise with gluteal set  Hip abduction/adduction  Hip flex/ext    Sit to stand from pool stool    Fwd step ups on 4" step: 2x10/LE  Lateral step ups on 4" step: 2x10/LE  + Step down taps on 4" step x 20    Walking marches x 2 laps w/orange Dumbbell     UE EX/CORE  x 20  Shoulder flex/ext TA activation paddles CLOSED  Shoulder horizontal abd/add TA activation paddles CLOSED  Shoulder abd/add with TA activation and paddles CLOSED    Mini squat " with push/pull red kickboard x 20    ENDURANCE  LTR x 3'  Bicycle in // bars x 3'    Patient Education and Home Exercises     Home Exercises Provided and Patient Education Provided     Education provided:   Role of aquatic therapy  Hydration post therapy  Patient was educated on all therapeutic interventions performed during today's treatment visit.     Written Home Exercises Provided: Patient instructed to cont prior HEP. Exercises were reviewed and [unfilled] was able to demonstrate them prior to the end of the session.  [unfilled] demonstrated good  understanding of the education provided. See EMR under Patient Instructions for exercises provided during therapy sessions    ASSESSMENT      Patient presents reporting decrease in bilateral knee pain after receiving injections in both knees. Added light resistance to patient's functional squat and global hip strengthening exercises. She displays excessive trunk extension during squats requiring cues to hinge at hips and allow for greater gluteal recruitment. During forward step ups the patient no longer experiences bilateral knee pain, however she does require cues for forward weight transfer onto leading lower extremity prior to ascension. To further improve bilateral quadriceps eccentric control step down taps were incorporated into patient's treatment. Will progress as tolerate.     Pt prognosis is Good.     Pt will continue to benefit from skilled outpatient physical therapy to address the deficits listed in the problem list box on initial evaluation, provide pt/family education and to maximize pt's level of independence in the home and community environment.     Pt's spiritual, cultural and educational needs considered and pt agreeable to plan of care and goals.     Anticipated barriers to physical therapy: none    Goals:   Short Term Goals: 6 weeks   1. Patient will be independent in HEP & progressions.  2. Patient will achieve TUG score of 12 sec to  demonstrate improved mobility  3. The patient will achieve 5 sit to stand score of 11.4 seconds to demonstrate improved transfers and endurance.      Long Term Goals: 12 weeks   1. The patient will demonstrate independence with extensive HEP.  2. Patient will achieve TUG score of 8  sec to demonstrate improved mobility.  3. Patent is able to demonstrate MMT 4/5 on all lower quarter MMT without pain reports during testing.      PLAN     Plan of care Certification: 5/1/2024 to 7/1/2025.     Outpatient Physical Therapy 1-2 times weekly for 10-12 weeks to include the following interventions: manual therapy, aquatic therapy, patient education, therapeutic exercise, therapeutic activities.     Patient may be seen by PTA as part of rehabilitation team.    Jennyfer Cedeno, PT     05/22/2024

## 2024-05-29 ENCOUNTER — CLINICAL SUPPORT (OUTPATIENT)
Dept: REHABILITATION | Facility: HOSPITAL | Age: 54
End: 2024-05-29
Payer: COMMERCIAL

## 2024-05-29 DIAGNOSIS — G89.29 CHRONIC PAIN OF BOTH KNEES: Primary | ICD-10-CM

## 2024-05-29 DIAGNOSIS — M25.561 CHRONIC PAIN OF BOTH KNEES: Primary | ICD-10-CM

## 2024-05-29 DIAGNOSIS — M25.562 CHRONIC PAIN OF BOTH KNEES: Primary | ICD-10-CM

## 2024-05-29 PROCEDURE — 97113 AQUATIC THERAPY/EXERCISES: CPT

## 2024-05-29 NOTE — PROGRESS NOTES
"[facility]   Physical Therapy Treatment Note     Date: 5/1/2024   Name: Chapincito Burns  Clinic Number: [id number]     Therapy Diagnosis:        Encounter Diagnoses   Name Primary?    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    Chapincito was compliant with home exercise program.  Response to previous treatment: no adverse effects  Functional change: initiated aquatic     Pain: 3/10  Location: bilateral knee      OBJECTIVE     Objective Measures updated at progress report unless specified.     Treatment     Chapincito received aquatic therapeutic exercises to develop strength, endurance, ROM, flexibility, posture, and core stabilization for 55 minutes including:    FUNCTIONAL MOBILITY TRAINING x 2 laps each at beginning and 1 lap each at end of session  Walk forward/backward/lateral    STRETCHES 2 x 30sec  Calf stretch  Quad stretch at stool    LE EX x 20 w/ RTB  Squat  Heel raise with gluteal set on stairs  Hip abduction/adduction  Hip flex/ext    Sit to stand from pool stool    Fwd step ups on 8" step: 2x10/LE  Lateral step ups on 8 " step: 2x10/LE  Step down taps on 4" step x 20    Walking marches x 2 laps w/orange Dumbbell     UE EX/CORE  x 20  Shoulder flex/ext TA activation paddles CLOSED  Shoulder horizontal abd/add TA activation paddles CLOSED  Shoulder abd/add with TA activation and paddles CLOSED    Mini squat with push/pull " red kickboard x 20    ENDURANCE  LTR x 3'  Bicycle in // bars x 3'    Patient Education and Home Exercises     Home Exercises Provided and Patient Education Provided     Education provided:   Role of aquatic therapy  Hydration post therapy  Patient was educated on all therapeutic interventions performed during today's treatment visit.     Written Home Exercises Provided: Patient instructed to cont prior HEP. Exercises were reviewed and [unfilled] was able to demonstrate them prior to the end of the session.  [unfilled] demonstrated good  understanding of the education provided. See EMR under Patient Instructions for exercises provided during therapy sessions    ASSESSMENT      Patient able to progress forward and lateral step up height indicating improved bilateral hip rotator and quadriceps stability. Moderate visual cues still needed to improve patient's hip hinge during squat. Will progress as tolerate.     Pt prognosis is Good.     Pt will continue to benefit from skilled outpatient physical therapy to address the deficits listed in the problem list box on initial evaluation, provide pt/family education and to maximize pt's level of independence in the home and community environment.     Pt's spiritual, cultural and educational needs considered and pt agreeable to plan of care and goals.     Anticipated barriers to physical therapy: none    Goals:   Short Term Goals: 6 weeks   1. Patient will be independent in HEP & progressions.  2. Patient will achieve TUG score of 12 sec to demonstrate improved mobility  3. The patient will achieve 5 sit to stand score of 11.4 seconds to demonstrate improved transfers and endurance.      Long Term Goals: 12 weeks   1. The patient will demonstrate independence with extensive HEP.  2. Patient will achieve TUG score of 8  sec to demonstrate improved mobility.  3. Patent is able to demonstrate MMT 4/5 on all lower quarter MMT without pain reports during testing.      PLAN      Plan of care Certification: 5/1/2024 to 7/1/2025.     Outpatient Physical Therapy 1-2 times weekly for 10-12 weeks to include the following interventions: manual therapy, aquatic therapy, patient education, therapeutic exercise, therapeutic activities.     Patient may be seen by PTA as part of rehabilitation team.    Jennyfer Cedeno, PT     05/29/2024

## 2024-06-05 ENCOUNTER — CLINICAL SUPPORT (OUTPATIENT)
Dept: REHABILITATION | Facility: HOSPITAL | Age: 54
End: 2024-06-05
Payer: COMMERCIAL

## 2024-06-05 DIAGNOSIS — M25.562 CHRONIC PAIN OF BOTH KNEES: Primary | ICD-10-CM

## 2024-06-05 DIAGNOSIS — M25.561 CHRONIC PAIN OF BOTH KNEES: Primary | ICD-10-CM

## 2024-06-05 DIAGNOSIS — G89.29 CHRONIC PAIN OF BOTH KNEES: Primary | ICD-10-CM

## 2024-06-05 PROCEDURE — 97113 AQUATIC THERAPY/EXERCISES: CPT

## 2024-06-05 NOTE — PROGRESS NOTES
"[facility]   Physical Therapy Treatment Note     Date: 5/1/2024   Name: Chapincito Burns  Clinic Number: [id number]     Therapy Diagnosis:        Encounter Diagnoses   Name Primary?    Chronic pain of both knees Yes    Arthritis of both knees        Physician Orders: PT Eval and Treat /Aquatic Therapy  Medical Diagnosis from Referral: M17.0 (ICD-10-CM) - Arthritis of both knees  Evaluation Date: 5/1/2024  Authorization Period Expiration: 4/26/2025  Plan of Care Expiration: 7/1/2025  Visit # / Visits authorized: 4/20   PTA Visit #: 0/5      Time In: 9:22 am   Time Out: 10:30 am   Total Billable Time: 38 minutes    Precautions: Standard and Fall    SUBJECTIVE     Pt reports: she is very tired today. 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     Chapincito was compliant with home exercise program.    Response to previous treatment: no adverse effects    Functional change: initiated aquatic     Pain: 3/10  Location: bilateral knee      OBJECTIVE     Objective Measures updated at progress report unless specified.     Treatment     Chapincito received aquatic therapeutic exercises to develop strength, endurance, ROM, flexibility, posture, and core stabilization for 68 minutes including:    FUNCTIONAL MOBILITY TRAINING x 2 laps each at beginning and 1 lap each at end of session  Walk forward/backward/lateral    STRETCHES 2 x 30sec  Calf stretch  Quad stretch at stool    LE EX x 25 w/ RTB  Squat  Heel raise with gluteal set on stairs  Hip abduction/adduction  Hip flex/ext    Seated on Pool stool with RTB  Sit to stand   Clam  LAQ     Fwd step ups on 8" step: 2x10/LE  Lateral step ups on 8 " step: 2x10/LE    Step down taps on 4" step x 20    Walking marches x 2 laps w/orange Dumbbell with cueing to reduce speed     UE EX/CORE  x 25  Shoulder flex/ext TA activation paddles CLOSED  Shoulder " horizontal abd/add TA activation paddles CLOSED  Shoulder abd/add with TA activation and paddles CLOSED    Mini squat with push/pull BLUE kickboard x 20    ENDURANCE  LTR x 3'  Bicycle in // bars x 3'    Patient Education and Home Exercises     Home Exercises Provided and Patient Education Provided     Education provided:   Role of aquatic therapy  Hydration post therapy  Patient was educated on all therapeutic interventions performed during today's treatment visit.     Written Home Exercises Provided: Patient instructed to cont prior HEP. Exercises were reviewed and Chapincito Burns was able to demonstrate them prior to the end of the session.  Chapincito Burns  demonstrated good  understanding of the education provided. See EMR under Patient Instructions for exercises provided during therapy sessions    ASSESSMENT       The patient performed advanced reps with all UE and LE exercises performed the the previous visit well. In addition she was able to perform advanced seated exercises well today with resistance.      Pt prognosis is Good.     Pt will continue to benefit from skilled outpatient physical therapy to address the deficits listed in the problem list box on initial evaluation, provide pt/family education and to maximize pt's level of independence in the home and community environment.     Pt's spiritual, cultural and educational needs considered and pt agreeable to plan of care and goals.     Anticipated barriers to physical therapy: none    Goals:   Short Term Goals: 6 weeks   1. Patient will be independent in HEP & progressions.  2. Patient will achieve TUG score of 12 sec to demonstrate improved mobility  3. The patient will achieve 5 sit to stand score of 11.4 seconds to demonstrate improved transfers and endurance.      Long Term Goals: 12 weeks   1. The patient will demonstrate independence with extensive HEP.  2. Patient will achieve TUG score of 8  sec to demonstrate improved mobility.  3.  Patent is able to demonstrate MMT 4/5 on all lower quarter MMT without pain reports during testing.      PLAN     Plan of care Certification: 5/1/2024 to 7/1/2025.     Outpatient Physical Therapy 1-2 times weekly for 10-12 weeks to include the following interventions: manual therapy, aquatic therapy, patient education, therapeutic exercise, therapeutic activities.     Patient may be seen by PTA as part of rehabilitation team.    Joanne Prater, PT     06/05/2024

## 2024-06-07 ENCOUNTER — CLINICAL SUPPORT (OUTPATIENT)
Dept: REHABILITATION | Facility: HOSPITAL | Age: 54
End: 2024-06-07
Payer: COMMERCIAL

## 2024-06-07 DIAGNOSIS — M25.562 CHRONIC PAIN OF BOTH KNEES: Primary | ICD-10-CM

## 2024-06-07 DIAGNOSIS — M25.561 CHRONIC PAIN OF BOTH KNEES: Primary | ICD-10-CM

## 2024-06-07 DIAGNOSIS — G89.29 CHRONIC PAIN OF BOTH KNEES: Primary | ICD-10-CM

## 2024-06-07 DIAGNOSIS — I10 HYPERTENSION, ESSENTIAL: ICD-10-CM

## 2024-06-07 PROCEDURE — 97113 AQUATIC THERAPY/EXERCISES: CPT

## 2024-06-07 RX ORDER — AMLODIPINE BESYLATE 10 MG/1
10 TABLET ORAL DAILY
Qty: 90 TABLET | Refills: 3 | Status: SHIPPED | OUTPATIENT
Start: 2024-06-07

## 2024-06-07 NOTE — PROGRESS NOTES
"[facility]   Physical Therapy Treatment Note     Date: 5/1/2024   Name: Chapincito Burns  Clinic Number: [id number]     Therapy Diagnosis:        Encounter Diagnoses   Name Primary?    Chronic pain of both knees Yes    Arthritis of both knees        Physician Orders: PT Eval and Treat /Aquatic Therapy  Medical Diagnosis from Referral: M17.0 (ICD-10-CM) - Arthritis of both knees  Evaluation Date: 5/1/2024  Authorization Period Expiration: 4/26/2025  Plan of Care Expiration: 7/1/2025  Visit # / Visits authorized: 5/20   PTA Visit #: 0/5      Time In: 9:25 am   Time Out: 10:26 am   Total Billable Time: 56 minutes    Precautions: Standard and Fall    SUBJECTIVE     Pt reports: She is feeling stronger and more stable, so she hasn't had to wear the brace as much.    Chapincito was compliant with home exercise program.    Response to previous treatment: no adverse effects    Functional change: initiated aquatic     Pain: 2/10  Location: bilateral knee      OBJECTIVE     Objective Measures updated at progress report unless specified.     Treatment     Chapincito received aquatic therapeutic exercises to develop strength, endurance, ROM, flexibility, posture, and core stabilization for 56 minutes including:    FUNCTIONAL MOBILITY TRAINING x 2 laps each at beginning and 1 lap each at end of session  Walk forward/backward/lateral    STRETCHES 2 x 30sec  Calf stretch   Quad stretch at stool    LE EX x 25 w/ RTB  Squat  Heel raise with gluteal set on stairs/ladder  Hip abduction/adduction  Hip flex/ext    Seated on Pool stool with GTB  Sit to stand   Clam  LAQ     x 20  Fwd step ups on 8" step w/contralateral hip flexion   Lateral step ups on 8 " step  Step down taps on 4" step     Walking marches x 2 laps w/orange Dumbbell with cueing to reduce speed     UE EX/CORE  x 25  Shoulder flex/ext TA activation paddles CLOSED  Shoulder horizontal abd/add TA activation paddles CLOSED  Shoulder abd/add with TA " activation and paddles CLOSED    Mini squat with push/pull BLUE kickboard x 20    ENDURANCE  LTR x 3'  Bicycle in // bars x 3'    Patient Education and Home Exercises     Home Exercises Provided and Patient Education Provided     Education provided:   Role of aquatic therapy  Hydration post therapy  Patient was educated on all therapeutic interventions performed during today's treatment visit.     Written Home Exercises Provided: Patient instructed to cont prior HEP. Exercises were reviewed and Chapincito Burns was able to demonstrate them prior to the end of the session.  Chapincito Burns  demonstrated good  understanding of the education provided. See EMR under Patient Instructions for exercises provided during therapy sessions    ASSESSMENT       Patient tolerated increase in resistance of seated lower quarter strength exercises well. Patient continues to require maximal verbal and visual cues during step downs to reduce anterior tibial translation via increased hip hinge. She also displays tendency to squat with bilateral knee valgus, and benefited from cues for knee over second toe alignment with hip external rotation. Continue to progress patient as tolerated.      Pt prognosis is Good.     Pt will continue to benefit from skilled outpatient physical therapy to address the deficits listed in the problem list box on initial evaluation, provide pt/family education and to maximize pt's level of independence in the home and community environment.     Pt's spiritual, cultural and educational needs considered and pt agreeable to plan of care and goals.     Anticipated barriers to physical therapy: none    Goals:   Short Term Goals: 6 weeks   1. Patient will be independent in HEP & progressions.  2. Patient will achieve TUG score of 12 sec to demonstrate improved mobility  3. The patient will achieve 5 sit to stand score of 11.4 seconds to demonstrate improved transfers and endurance.      Long Term Goals: 12  weeks   1. The patient will demonstrate independence with extensive HEP.  2. Patient will achieve TUG score of 8  sec to demonstrate improved mobility.  3. Patent is able to demonstrate MMT 4/5 on all lower quarter MMT without pain reports during testing.      PLAN     Plan of care Certification: 5/1/2024 to 7/1/2025.     Outpatient Physical Therapy 1-2 times weekly for 10-12 weeks to include the following interventions: manual therapy, aquatic therapy, patient education, therapeutic exercise, therapeutic activities.     Patient may be seen by PTA as part of rehabilitation team.    Jennyfer Cedeno, PT     06/07/2024

## 2024-06-10 ENCOUNTER — PATIENT MESSAGE (OUTPATIENT)
Dept: INTERNAL MEDICINE | Facility: CLINIC | Age: 54
End: 2024-06-10
Payer: COMMERCIAL

## 2024-06-12 ENCOUNTER — CLINICAL SUPPORT (OUTPATIENT)
Dept: REHABILITATION | Facility: HOSPITAL | Age: 54
End: 2024-06-12
Payer: COMMERCIAL

## 2024-06-12 DIAGNOSIS — G89.29 CHRONIC PAIN OF BOTH KNEES: Primary | ICD-10-CM

## 2024-06-12 DIAGNOSIS — M25.561 CHRONIC PAIN OF BOTH KNEES: Primary | ICD-10-CM

## 2024-06-12 DIAGNOSIS — M25.562 CHRONIC PAIN OF BOTH KNEES: Primary | ICD-10-CM

## 2024-06-12 PROCEDURE — 97113 AQUATIC THERAPY/EXERCISES: CPT

## 2024-06-12 NOTE — PROGRESS NOTES
"[facility]   Physical Therapy Treatment Note     Date: 5/1/2024   Name: Chapincito Burns  Clinic Number: [id number]     Therapy Diagnosis:        Encounter Diagnoses   Name Primary?    Chronic pain of both knees Yes    Arthritis of both knees        Physician Orders: PT Eval and Treat /Aquatic Therapy  Medical Diagnosis from Referral: M17.0 (ICD-10-CM) - Arthritis of both knees  Evaluation Date: 5/1/2024  Authorization Period Expiration: 4/26/2025  Plan of Care Expiration: 7/1/2025  Visit # / Visits authorized: 5/20   PTA Visit #: 0/5      Time In: 9:23 am   Time Out: 10:30 am   Total Billable Time: 30 minutes    Precautions: Standard and Fall    SUBJECTIVE     Pt reports: That the injections did not last very long. Her pain is worse at night than morning.      Chapincito was compliant with home exercise program.    Response to previous treatment: no adverse effects    Functional change: initiated aquatic     Pain: 3/10  Location: bilateral knee      OBJECTIVE     Objective Measures updated at progress report unless specified.     Treatment     Chapincito received aquatic therapeutic exercises to develop strength, endurance, ROM, flexibility, posture, and core stabilization for 60 minutes including:    FUNCTIONAL MOBILITY TRAINING x 2 laps each at beginning and 1 lap each at end of session  Walk forward/backward/lateral    STRETCHES 2 x 30sec  Calf stretch   Quad stretch at stool    LE EX x 25 w/ GTB  Squat  Heel raise with gluteal set on stairs/ladder  Hip abduction/adduction  Hip flex/ext    Seated on Pool stool with GTB  Sit to stand   Clam  LAQ     x 20  Fwd step ups on 8" step w/contralateral hip flexion   Lateral step ups on 8 " step  Step down taps on 4" step     Walking marches x 2 laps w/orange Dumbbell with cueing to reduce speed     UE EX/CORE  x 25  Shoulder flex/ext TA activation paddles CLOSED  Shoulder horizontal abd/add TA activation paddles CLOSED  Shoulder abd/add with TA " activation and paddles CLOSED    Mini squat with push/pull BLUE kickboard x 20    ENDURANCE  LTR x 3'  Bicycle in // bars x 3'    Patient Education and Home Exercises     Home Exercises Provided and Patient Education Provided     Education provided:   Role of aquatic therapy  Hydration post therapy  Patient was educated on all therapeutic interventions performed during today's treatment visit.     Written Home Exercises Provided: Patient instructed to cont prior HEP. Exercises were reviewed and Chapincito Burns was able to demonstrate them prior to the end of the session.  Chapincito Burns  demonstrated good  understanding of the education provided. See EMR under Patient Instructions for exercises provided during therapy sessions    ASSESSMENT       Patient displays increased lower quarter muscular endurance and strength as demonstrated by good tolerance to increase in resistance of standing squats and single leg hip strengthening. She continues to require frequent cues to reduce static bilateral knee valgus which is likely contributing to medial joint line knee pain. Continue to progress patient as tolerated.      Pt prognosis is Good.     Pt will continue to benefit from skilled outpatient physical therapy to address the deficits listed in the problem list box on initial evaluation, provide pt/family education and to maximize pt's level of independence in the home and community environment.     Pt's spiritual, cultural and educational needs considered and pt agreeable to plan of care and goals.     Anticipated barriers to physical therapy: none    Goals:   Short Term Goals: 6 weeks   1. Patient will be independent in HEP & progressions.  2. Patient will achieve TUG score of 12 sec to demonstrate improved mobility  3. The patient will achieve 5 sit to stand score of 11.4 seconds to demonstrate improved transfers and endurance.      Long Term Goals: 12 weeks   1. The patient will demonstrate independence with  extensive HEP.  2. Patient will achieve TUG score of 8  sec to demonstrate improved mobility.  3. Patent is able to demonstrate MMT 4/5 on all lower quarter MMT without pain reports during testing.      PLAN     Plan of care Certification: 5/1/2024 to 7/1/2025.     Outpatient Physical Therapy 1-2 times weekly for 10-12 weeks to include the following interventions: manual therapy, aquatic therapy, patient education, therapeutic exercise, therapeutic activities.     Patient may be seen by PTA as part of rehabilitation team.    Jennyfer Cedeno, PT     06/12/2024

## 2024-06-14 ENCOUNTER — CLINICAL SUPPORT (OUTPATIENT)
Dept: REHABILITATION | Facility: HOSPITAL | Age: 54
End: 2024-06-14
Payer: COMMERCIAL

## 2024-06-14 DIAGNOSIS — M25.561 CHRONIC PAIN OF BOTH KNEES: Primary | ICD-10-CM

## 2024-06-14 DIAGNOSIS — G89.29 CHRONIC PAIN OF BOTH KNEES: Primary | ICD-10-CM

## 2024-06-14 DIAGNOSIS — M25.562 CHRONIC PAIN OF BOTH KNEES: Primary | ICD-10-CM

## 2024-06-14 PROCEDURE — 97113 AQUATIC THERAPY/EXERCISES: CPT

## 2024-06-14 NOTE — PROGRESS NOTES
"[facility]   Physical Therapy Treatment Note     Date: 5/1/2024   Name: Chapincito Burns  Clinic Number: [id number]     Therapy Diagnosis:        Encounter Diagnoses   Name Primary?    Chronic pain of both knees Yes    Arthritis of both knees        Physician Orders: PT Eval and Treat /Aquatic Therapy  Medical Diagnosis from Referral: M17.0 (ICD-10-CM) - Arthritis of both knees  Evaluation Date: 5/1/2024  Authorization Period Expiration: 4/26/2025  Plan of Care Expiration: 7/1/2025  Visit # / Visits authorized: 5/20   PTA Visit #: 0/5      Time In: 9:30 am   Time Out: 10:30 am   Total Billable Time: 60 minutes    Precautions: Standard and Fall    SUBJECTIVE     Pt reports: That pain continues to be worse towards the end of the day versus mornings. She says that she has been able to get up out of a chair with more ease since starting therapy.    Chapincito was compliant with home exercise program.    Response to previous treatment: no adverse effects    Functional change: initiated aquatic     Pain: 3/10  Location: bilateral knee      OBJECTIVE     Objective Measures updated at progress report unless specified.     Treatment     Chapincito received aquatic therapeutic exercises to develop strength, endurance, ROM, flexibility, posture, and core stabilization for 60 minutes including:    FUNCTIONAL MOBILITY TRAINING x 2 laps each at beginning and 1 lap each at end of session  Walk forward/backward/lateral    STRETCHES 2 x 30sec  Calf stretch   Quad stretch at stool    LE EX x 25 w/ GTB  Squat  Heel raise with gluteal set on stairs/ladder  Hip abduction/adduction  Hip flex/ext    Seated on Pool stool with GTB  Sit to stand   Clam  LAQ     x 20  Fwd step ups on 8" step w/contralateral hip flexion   Lateral step ups on 8 " step  Step down taps on 4" step     Walking marches x 2 laps w/orange Dumbbell with cueing to reduce speed     UE EX/CORE  x 25  Shoulder flex/ext TA w/ Multicolor " Dumbbells  Shoulder horizontal abd/add TA activation w/ Multicolor Dumbbells  Shoulder abd/add with TA activation w/ Multicolor Dumbbells     Mini squat with push/pull BLUE kickboard x 20    ENDURANCE  LTR x 3'  Bicycle in // bars x 3'    Patient Education and Home Exercises     Home Exercises Provided and Patient Education Provided     Education provided:   Role of aquatic therapy  Hydration post therapy  Patient was educated on all therapeutic interventions performed during today's treatment visit.     Written Home Exercises Provided: Patient instructed to cont prior HEP. Exercises were reviewed and Chapincito Burns was able to demonstrate them prior to the end of the session.  Chapincito Burns  demonstrated good  understanding of the education provided. See EMR under Patient Instructions for exercises provided during therapy sessions    ASSESSMENT       Patient presents reporting ability to perform sit to stands with greater ease. She shows gradual improvement in core stability and endurance as shown by good lower abdominal recruitment with higher resistance during standing core/upper extremity strengthening. Patient expressing good neuromuscular awareness of transverse abdominus engagement. Continue to progress patient as tolerated.      Pt prognosis is Good.     Pt will continue to benefit from skilled outpatient physical therapy to address the deficits listed in the problem list box on initial evaluation, provide pt/family education and to maximize pt's level of independence in the home and community environment.     Pt's spiritual, cultural and educational needs considered and pt agreeable to plan of care and goals.     Anticipated barriers to physical therapy: none    Goals:   Short Term Goals: 6 weeks   1. Patient will be independent in HEP & progressions.  2. Patient will achieve TUG score of 12 sec to demonstrate improved mobility  3. The patient will achieve 5 sit to stand score of 11.4 seconds to  demonstrate improved transfers and endurance.      Long Term Goals: 12 weeks   1. The patient will demonstrate independence with extensive HEP.  2. Patient will achieve TUG score of 8  sec to demonstrate improved mobility.  3. Patent is able to demonstrate MMT 4/5 on all lower quarter MMT without pain reports during testing.      PLAN     Plan of care Certification: 5/1/2024 to 7/1/2025.     Outpatient Physical Therapy 1-2 times weekly for 10-12 weeks to include the following interventions: manual therapy, aquatic therapy, patient education, therapeutic exercise, therapeutic activities.     Patient may be seen by PTA as part of rehabilitation team.    Jennyfer Cedeno, PT     06/14/2024

## 2024-06-19 ENCOUNTER — CLINICAL SUPPORT (OUTPATIENT)
Dept: REHABILITATION | Facility: HOSPITAL | Age: 54
End: 2024-06-19
Payer: COMMERCIAL

## 2024-06-19 DIAGNOSIS — G89.29 CHRONIC PAIN OF BOTH KNEES: Primary | ICD-10-CM

## 2024-06-19 DIAGNOSIS — M25.562 CHRONIC PAIN OF BOTH KNEES: Primary | ICD-10-CM

## 2024-06-19 DIAGNOSIS — M25.561 CHRONIC PAIN OF BOTH KNEES: Primary | ICD-10-CM

## 2024-06-19 PROCEDURE — 97113 AQUATIC THERAPY/EXERCISES: CPT

## 2024-06-19 NOTE — PROGRESS NOTES
"[facility]   Physical Therapy Treatment Note     Date: 5/1/2024   Name: Chapincito Burns  Clinic Number: [id number]     Therapy Diagnosis:        Encounter Diagnoses   Name Primary?    Chronic pain of both knees Yes    Arthritis of both knees      Physician Orders: PT Eval and Treat /Aquatic Therapy  Medical Diagnosis from Referral: M17.0 (ICD-10-CM) - Arthritis of both knees  Evaluation Date: 5/1/2024  Authorization Period Expiration: 4/26/2025  Plan of Care Expiration: 7/1/2025  Visit # / Visits authorized: 6/20   PTA Visit #: 0/5      Time In: 9:29 am   Time Out: 10:23 am   Total Billable Time: 32 minutes    Precautions: Standard and Fall    SUBJECTIVE     Pt reports: That she's doing good, and is ready to advance her HEP.     Chapincito was compliant with home exercise program.    Response to previous treatment: no adverse effects    Functional change: initiated aquatic     Pain: 3/10  Location: bilateral knee      OBJECTIVE     Objective Measures updated at progress report unless specified.     Treatment     Chapincito received aquatic therapeutic exercises to develop strength, endurance, ROM, flexibility, posture, and core stabilization for 54 minutes including:    FUNCTIONAL MOBILITY TRAINING x 2 laps each at beginning and 1 lap each at end of session  Walk forward/backward/lateral    STRETCHES 2 x 30sec  Calf stretch   Quad stretch at stool    LE EX x 25 w/ GTB  Squat  Heel raise with gluteal set on stairs/ladder  Hip abduction/adduction  Hip flex/ext  Clam    Seated on Pool stool with GTB  Sit to stand   LAQ     x 20  Fwd step ups on 8" step w/contralateral hip flexion   Lateral step ups on 8 " step  Step down taps on 4" step     Walking marches x 2 laps w/orange Dumbbell with cueing to reduce speed     UE EX/CORE  x 25  Shoulder flex/ext TA w/ Multicolor Dumbbells  Shoulder horizontal abd/add TA activation w/ Multicolor Dumbbells  Shoulder abd/add with TA activation w/ Multicolor " Dumbbells     Mini squat with push/pull BLUE kickboard x 20    ENDURANCE  LTR x 3'  Bicycle in // bars x 3'    Patient Education and Home Exercises     Home Exercises Provided and Patient Education Provided     Education provided:   Role of aquatic therapy  Hydration post therapy  Patient was educated on all therapeutic interventions performed during today's treatment visit.     Written Home Exercises Provided: Patient instructed to cont prior HEP. Exercises were reviewed and Chapincito Burns was able to demonstrate them prior to the end of the session.  Chapincito Burns  demonstrated good  understanding of the education provided. See EMR under Patient Instructions for exercises provided during therapy sessions    ASSESSMENT       Advanced patient's clamshells to standing for additional functional hip external rotator strengthening which she performed well. Advanced HEP to reflect current level of progress and allow for continued strength and conditioning progress. Continue to progress patient as tolerated.      Pt prognosis is Good.     Pt will continue to benefit from skilled outpatient physical therapy to address the deficits listed in the problem list box on initial evaluation, provide pt/family education and to maximize pt's level of independence in the home and community environment.     Pt's spiritual, cultural and educational needs considered and pt agreeable to plan of care and goals.     Anticipated barriers to physical therapy: none    Goals:   Short Term Goals: 6 weeks   1. Patient will be independent in HEP & progressions.  2. Patient will achieve TUG score of 12 sec to demonstrate improved mobility  3. The patient will achieve 5 sit to stand score of 11.4 seconds to demonstrate improved transfers and endurance.      Long Term Goals: 12 weeks   1. The patient will demonstrate independence with extensive HEP.  2. Patient will achieve TUG score of 8  sec to demonstrate improved mobility.  3. Patent  is able to demonstrate MMT 4/5 on all lower quarter MMT without pain reports during testing.      PLAN     Plan of care Certification: 5/1/2024 to 7/1/2025.     Outpatient Physical Therapy 1-2 times weekly for 10-12 weeks to include the following interventions: manual therapy, aquatic therapy, patient education, therapeutic exercise, therapeutic activities.     Patient may be seen by PTA as part of rehabilitation team.    Jennyfer Cedeno, PT     06/19/2024

## 2024-06-21 ENCOUNTER — CLINICAL SUPPORT (OUTPATIENT)
Dept: REHABILITATION | Facility: HOSPITAL | Age: 54
End: 2024-06-21
Payer: COMMERCIAL

## 2024-06-21 DIAGNOSIS — M25.561 CHRONIC PAIN OF BOTH KNEES: Primary | ICD-10-CM

## 2024-06-21 DIAGNOSIS — G89.29 CHRONIC PAIN OF BOTH KNEES: Primary | ICD-10-CM

## 2024-06-21 DIAGNOSIS — M25.562 CHRONIC PAIN OF BOTH KNEES: Primary | ICD-10-CM

## 2024-06-21 PROCEDURE — 97113 AQUATIC THERAPY/EXERCISES: CPT

## 2024-06-21 NOTE — PROGRESS NOTES
"[facility]   Physical Therapy Treatment Note     Date: 5/1/2024   Name: Chapincito Burns  Clinic Number: [id number]     Therapy Diagnosis:        Encounter Diagnoses   Name Primary?    Chronic pain of both knees Yes    Arthritis of both knees      Physician Orders: PT Eval and Treat /Aquatic Therapy  Medical Diagnosis from Referral: M17.0 (ICD-10-CM) - Arthritis of both knees  Evaluation Date: 5/1/2024  Authorization Period Expiration: 4/26/2025  Plan of Care Expiration: 7/1/2025  Visit # / Visits authorized: 9/20 + Eval    Time In: 11:30 am   Time Out: 12:30 pm  Total Billable Time: 60 minutes    Precautions: Standard and Fall    SUBJECTIVE     Pt reports: That she went to the pool with her niece yesterday.     Chapincito was compliant with home exercise program.    Response to previous treatment: no adverse effects    Functional change: initiated aquatic     Pain: 3/10  Location: bilateral knee      OBJECTIVE     Objective Measures updated at progress report unless specified.     Treatment     Chapincito received aquatic therapeutic exercises to develop strength, endurance, ROM, flexibility, posture, and core stabilization for 60 minutes including:    FUNCTIONAL MOBILITY TRAINING x 2 laps each at beginning and 1 lap each at end of session  Walk forward/backward/lateral    STRETCHES 2 x 30sec  Calf stretch   Quad stretch at stool    LE EX x 30 w/ GTB  Squat  Heel raise with gluteal set on stairs/ladder  Hip abduction/adduction  Hip flex/ext  Clam    Seated on Pool stool with GTB  Sit to stand   LAQ     x25  Fwd step ups on 8" step w/contralateral hip flexion   Lateral step ups on 8 " step  Step down taps on 4" step     Walking marches x 2 laps w/orange Dumbbell with cueing to reduce speed     UE EX/CORE  x 30   Shoulder flex/ext TA w/ Multicolor Dumbbells  Shoulder horizontal abd/add TA activation w/ Multicolor Dumbbells  Shoulder abd/add with TA activation w/ Multicolor Dumbbells     Mini " squat with push/pull BLUE kickboard     ENDURANCE  LTR x 3'  Bicycle in // bars x 3'    Patient Education and Home Exercises     Home Exercises Provided and Patient Education Provided     Education provided:   Role of aquatic therapy  Hydration post therapy  Patient was educated on all therapeutic interventions performed during today's treatment visit.     Written Home Exercises Provided: Patient instructed to cont prior HEP. Exercises were reviewed and Chapincito Burns was able to demonstrate them prior to the end of the session.  Chapincito Burns  demonstrated good  understanding of the education provided. See EMR under Patient Instructions for exercises provided during therapy sessions    ASSESSMENT       The patient performed advanced reps to 30 for all UE and LE exercises today and 25 reps for all step ups/step downs today.   She was able to perform walking marches with good dynamic balance control with minimal sway noted but no LOB.     Pt prognosis is Good.     Pt will continue to benefit from skilled outpatient physical therapy to address the deficits listed in the problem list box on initial evaluation, provide pt/family education and to maximize pt's level of independence in the home and community environment.     Pt's spiritual, cultural and educational needs considered and pt agreeable to plan of care and goals.     Anticipated barriers to physical therapy: none    Goals:   Short Term Goals: 6 weeks   1. Patient will be independent in HEP & progressions.  2. Patient will achieve TUG score of 12 sec to demonstrate improved mobility  3. The patient will achieve 5 sit to stand score of 11.4 seconds to demonstrate improved transfers and endurance.      Long Term Goals: 12 weeks   1. The patient will demonstrate independence with extensive HEP.  2. Patient will achieve TUG score of 8  sec to demonstrate improved mobility.  3. Patent is able to demonstrate MMT 4/5 on all lower quarter MMT without pain  reports during testing.      PLAN     Plan of care Certification: 5/1/2024 to 7/1/2025.     Outpatient Physical Therapy 1-2 times weekly for 10-12 weeks to include the following interventions: manual therapy, aquatic therapy, patient education, therapeutic exercise, therapeutic activities.     Patient may be seen by PTA as part of rehabilitation team.    Joanne Prater, PT     06/21/2024

## 2024-06-26 ENCOUNTER — CLINICAL SUPPORT (OUTPATIENT)
Dept: REHABILITATION | Facility: HOSPITAL | Age: 54
End: 2024-06-26
Payer: COMMERCIAL

## 2024-06-26 DIAGNOSIS — M25.561 CHRONIC PAIN OF BOTH KNEES: Primary | ICD-10-CM

## 2024-06-26 DIAGNOSIS — G89.29 CHRONIC PAIN OF BOTH KNEES: Primary | ICD-10-CM

## 2024-06-26 DIAGNOSIS — M25.562 CHRONIC PAIN OF BOTH KNEES: Primary | ICD-10-CM

## 2024-06-26 PROCEDURE — 97113 AQUATIC THERAPY/EXERCISES: CPT

## 2024-06-26 NOTE — PROGRESS NOTES
"[facility]   Physical Therapy Treatment Note     Date: 5/1/2024   Name: Chapincito Burns  Clinic Number: [id number]     Therapy Diagnosis:        Encounter Diagnoses   Name Primary?    Chronic pain of both knees Yes    Arthritis of both knees      Physician Orders: PT Eval and Treat /Aquatic Therapy  Medical Diagnosis from Referral: M17.0 (ICD-10-CM) - Arthritis of both knees  Evaluation Date: 5/1/2024  Authorization Period Expiration: 4/26/2025  Plan of Care Expiration: 7/1/2025  Visit # / Visits authorized: 10/20 + Eval    Time In: 9:25 am   Time Out: 10:40 am   Total Billable Time: 30 minutes    Precautions: Standard and Fall    SUBJECTIVE     Pt reports: she is feeling good today.     Chapincito was compliant with home exercise program.    Response to previous treatment: no adverse effects    Functional change: improved endurance    Pain: 3/10  Location: bilateral knee      OBJECTIVE     Objective Measures updated at progress report unless specified.     Treatment     Chapincito received aquatic therapeutic exercises to develop strength, endurance, ROM, flexibility, posture, and core stabilization for 65 minutes including:    FUNCTIONAL MOBILITY TRAINING x 2 laps each at beginning and 1 lap each at end of session  Walk forward/backward/lateral    STRETCHES 2 x 30sec  Calf stretch   Quad stretch at stool    LE EX x 30 w/ GTB  Squat  Heel raise with gluteal set on stairs/ladder  Hip abduction/adduction  Hip flex/ext  Clam    Seated on Pool stool with GTB  Sit to stand   LAQ     x25  Fwd step ups on 8" step w/contralateral hip flexion   Lateral step ups on 8 " step  Step down taps on 4" step     Walking marches x 2 laps w/orange Dumbbell with cueing to reduce speed     UE EX/CORE  x 30   Shoulder flex/ext TA w/ Multicolor Dumbbells  Shoulder horizontal abd/add TA activation w/ Multicolor Dumbbells  Shoulder abd/add with TA activation w/ Multicolor Dumbbells     Mini squat with push/pull BLUE " kickboard     ENDURANCE  LTR x 3'  Bicycle in // bars x 3'    Patient Education and Home Exercises     Home Exercises Provided and Patient Education Provided     Education provided:   Role of aquatic therapy  Hydration post therapy  Patient was educated on all therapeutic interventions performed during today's treatment visit.     Written Home Exercises Provided: Patient instructed to cont prior HEP. Exercises were reviewed and Chapincito Burns was able to demonstrate them prior to the end of the session.  Chapincito Burns  demonstrated good  understanding of the education provided. See EMR under Patient Instructions for exercises provided during therapy sessions    ASSESSMENT       The patient continues to progress well with therapy progressions with improved endurance and reduced soreness noted following the sessions.  She required reduced cueing for core activation today.     Pt prognosis is Good.     Pt will continue to benefit from skilled outpatient physical therapy to address the deficits listed in the problem list box on initial evaluation, provide pt/family education and to maximize pt's level of independence in the home and community environment.     Pt's spiritual, cultural and educational needs considered and pt agreeable to plan of care and goals.     Anticipated barriers to physical therapy: none    Goals:   Short Term Goals: 6 weeks   1. Patient will be independent in HEP & progressions.  2. Patient will achieve TUG score of 12 sec to demonstrate improved mobility  3. The patient will achieve 5 sit to stand score of 11.4 seconds to demonstrate improved transfers and endurance.      Long Term Goals: 12 weeks   1. The patient will demonstrate independence with extensive HEP.  2. Patient will achieve TUG score of 8  sec to demonstrate improved mobility.  3. Patent is able to demonstrate MMT 4/5 on all lower quarter MMT without pain reports during testing.      PLAN     Plan of care Certification:  5/1/2024 to 7/1/2025.     Outpatient Physical Therapy 1-2 times weekly for 10-12 weeks to include the following interventions: manual therapy, aquatic therapy, patient education, therapeutic exercise, therapeutic activities.     Patient may be seen by PTA as part of rehabilitation team.    Joanne Prater, PT     06/26/2024

## 2024-06-28 ENCOUNTER — CLINICAL SUPPORT (OUTPATIENT)
Dept: REHABILITATION | Facility: HOSPITAL | Age: 54
End: 2024-06-28
Payer: COMMERCIAL

## 2024-06-28 DIAGNOSIS — M25.561 CHRONIC PAIN OF BOTH KNEES: Primary | ICD-10-CM

## 2024-06-28 DIAGNOSIS — M25.562 CHRONIC PAIN OF BOTH KNEES: Primary | ICD-10-CM

## 2024-06-28 DIAGNOSIS — G89.29 CHRONIC PAIN OF BOTH KNEES: Primary | ICD-10-CM

## 2024-06-28 PROCEDURE — 97113 AQUATIC THERAPY/EXERCISES: CPT

## 2024-06-28 NOTE — PROGRESS NOTES
"[facility]   Physical Therapy Treatment Note     Date: 5/1/2024   Name: Chapincito Burns  Clinic Number: [id number]     Therapy Diagnosis:        Encounter Diagnoses   Name Primary?    Chronic pain of both knees Yes    Arthritis of both knees      Physician Orders: PT Eval and Treat /Aquatic Therapy  Medical Diagnosis from Referral: M17.0 (ICD-10-CM) - Arthritis of both knees  Evaluation Date: 5/1/2024  Authorization Period Expiration: 4/26/2025  Plan of Care Expiration: 7/1/2025  Visit # / Visits authorized: 11/20 + Eval    Time In: 8:29 am   Time Out: 9:36 am   Total Billable Time: 61 minutes    Precautions: Standard and Fall    SUBJECTIVE     Pt reports: she is feeling good today. She says that she has been doing the advanced HEP. She notices that when she fully bends her knee it's painful    Chapincito was compliant with home exercise program.    Response to previous treatment: no adverse effects    Functional change: improved endurance    Pain: 3/10  Location: bilateral knee      OBJECTIVE     Objective Measures updated at progress report unless specified.     Treatment     Chapincito received aquatic therapeutic exercises to develop strength, endurance, ROM, flexibility, posture, and core stabilization for 65 minutes including:    FUNCTIONAL MOBILITY TRAINING x 2 laps each at beginning and 1 lap each at end of session  Walk forward/backward/lateral    STRETCHES 2 x 30sec  Calf stretch   Quad stretch at stool    LE EX x 30 w/ GTB  Squat  Heel raise with gluteal set on stairs/ladder  Hip abduction/adduction  Hip flex/ext  Clam    Seated on Pool stool with GTB  Sit to stand   LAQ     x25  Fwd step ups on 8" step w/contralateral hip flexion   Lateral step ups on 8 " step  Step down taps on 4" step     Walking marches x 2 laps w/orange Dumbbell with cueing to reduce speed     UE EX/CORE  x 30   Shoulder flex/ext TA w/ Yellow Dumbbells  Shoulder horizontal abd/add TA activation w/ Yellow " Dumbbells  Shoulder abd/add with TA activation w/ Yellow Dumbbells     Mini squat with push/pull BLUE kickboard     ENDURANCE  LTR x 3'  Bicycle in // bars x 3'    Patient Education and Home Exercises     Home Exercises Provided and Patient Education Provided     Education provided:   Role of aquatic therapy  Hydration post therapy  Patient was educated on all therapeutic interventions performed during today's treatment visit.     Written Home Exercises Provided: Patient instructed to cont prior HEP. Exercises were reviewed and Chapincito Burns was able to demonstrate them prior to the end of the session.  Chapincito Burns  demonstrated good  understanding of the education provided. See EMR under Patient Instructions for exercises provided during therapy sessions    ASSESSMENT       Patient reports good HEP adherence to recently advanced HEP. She continues to show improvement in functional core stability with good tolerance to advanced resistance of upper extremity/core strengthening. With omission of upper extremity support during marches patient was able to maintain balance with minimal loss of balance. Therefore consider adding additional perturbation at next visit. Progress patient as tolerated.     Pt prognosis is Good.     Pt will continue to benefit from skilled outpatient physical therapy to address the deficits listed in the problem list box on initial evaluation, provide pt/family education and to maximize pt's level of independence in the home and community environment.     Pt's spiritual, cultural and educational needs considered and pt agreeable to plan of care and goals.     Anticipated barriers to physical therapy: none    Goals:   Short Term Goals: 6 weeks   1. Patient will be independent in HEP & progressions.  2. Patient will achieve TUG score of 12 sec to demonstrate improved mobility  3. The patient will achieve 5 sit to stand score of 11.4 seconds to demonstrate improved transfers and  endurance.      Long Term Goals: 12 weeks   1. The patient will demonstrate independence with extensive HEP.  2. Patient will achieve TUG score of 8  sec to demonstrate improved mobility.  3. Patent is able to demonstrate MMT 4/5 on all lower quarter MMT without pain reports during testing.      PLAN     Plan of care Certification: 5/1/2024 to 7/1/2025.     Outpatient Physical Therapy 1-2 times weekly for 10-12 weeks to include the following interventions: manual therapy, aquatic therapy, patient education, therapeutic exercise, therapeutic activities.     Patient may be seen by PTA as part of rehabilitation team.    Jennyfer Cedeno, PT     06/28/2024

## 2024-07-01 ENCOUNTER — OFFICE VISIT (OUTPATIENT)
Dept: BARIATRICS | Facility: CLINIC | Age: 54
End: 2024-07-01
Payer: COMMERCIAL

## 2024-07-01 VITALS
BODY MASS INDEX: 51.81 KG/M2 | HEART RATE: 92 BPM | SYSTOLIC BLOOD PRESSURE: 142 MMHG | WEIGHT: 256.5 LBS | DIASTOLIC BLOOD PRESSURE: 89 MMHG | OXYGEN SATURATION: 95 %

## 2024-07-01 DIAGNOSIS — E66.01 CLASS 3 SEVERE OBESITY WITH SERIOUS COMORBIDITY AND BODY MASS INDEX (BMI) OF 50.0 TO 59.9 IN ADULT, UNSPECIFIED OBESITY TYPE: Primary | ICD-10-CM

## 2024-07-01 PROCEDURE — 1159F MED LIST DOCD IN RCRD: CPT | Mod: CPTII,S$GLB,, | Performed by: INTERNAL MEDICINE

## 2024-07-01 PROCEDURE — 99999 PR PBB SHADOW E&M-EST. PATIENT-LVL IV: CPT | Mod: PBBFAC,,, | Performed by: INTERNAL MEDICINE

## 2024-07-01 PROCEDURE — 3008F BODY MASS INDEX DOCD: CPT | Mod: CPTII,S$GLB,, | Performed by: INTERNAL MEDICINE

## 2024-07-01 PROCEDURE — 99214 OFFICE O/P EST MOD 30 MIN: CPT | Mod: S$GLB,,, | Performed by: INTERNAL MEDICINE

## 2024-07-01 PROCEDURE — 1160F RVW MEDS BY RX/DR IN RCRD: CPT | Mod: CPTII,S$GLB,, | Performed by: INTERNAL MEDICINE

## 2024-07-01 PROCEDURE — 3079F DIAST BP 80-89 MM HG: CPT | Mod: CPTII,S$GLB,, | Performed by: INTERNAL MEDICINE

## 2024-07-01 PROCEDURE — 3077F SYST BP >= 140 MM HG: CPT | Mod: CPTII,S$GLB,, | Performed by: INTERNAL MEDICINE

## 2024-07-01 RX ORDER — TOPIRAMATE 25 MG/1
25 TABLET ORAL 2 TIMES DAILY
Qty: 180 TABLET | Refills: 0 | Status: SHIPPED | OUTPATIENT
Start: 2024-07-01 | End: 2025-07-01

## 2024-07-01 NOTE — PATIENT INSTRUCTIONS
Patient was informed that topiramate is used for migraine prevention and seizures. Weight loss is a common side effect that is well documented. S/he understands this. S/he was informed of the potential side effects such as serious and possibly fatal rash in which case the medication should be discontinued immediately. Paresthesias, forgetfulness, fatigue, kidney stones, GI symptoms, and changes in lab values such as electrolytes, blood counts and kidney function.    Start topiramate 25mg  morning and evening.       Increase low impact activity as tolerated.  Avoid high impact activity, very heavy lifting or other exercise regimens that may cause discomfort.     Add some type of resistance training 2-3 days a week. These can be body weight exercises, light weight or elastic bands. [employer] and [employer] are great sources for free work out plans and videos.     1000 dianna pb meal planner, meal ideas and exercise handouts given previously.       Tips for preparing for hurricane season:    If you are on weight loss medications, please take your medication with you in case of evacuation. Injectable medications should be transported with an ice pack if unopened or room temperature if you have started to use them.   Hurricane supplies do not necessarily need to be junk food or high in carbs or sugar. Shelf stable and healthy choices to include in your supplies could include:  Canned/packets of tuna or chicken  Apples, oranges, banana, pears  Beef or turkey jerky  Sugar free pudding  Pickle, olives, dill relish (mix with the tuna or chicken!)  Low sodium or no salt added canned vegetables  If you get bread, get lite bread (40 calories a slice)  0 sugar sports drinks  Water  String cheese will be okay for a few days without refrigeration or in an ice chest.   Peanut or other nut butter.   Parmesan crisps  Pork skins  Protein shakes (20-30g protein, less than 5 g sugar)  Protein bars (15 or more g protein, less than 5 g  sugar)  Don't forget disposable forks, spoons, plates in your supplies  If evacuated, manage stress by taking walks, reading or meditating.   If eating out make better choices when possible.   Salads with a lean protein and limited dressing, cheese or other toppings  Grilled chicken sandwich or burger without the bun.   Skip the fries!  Order water or unsweetened tea instead of soda  Grocery stores with a deli, salad/food bar can be a good quick and affordable option to replace fast food

## 2024-07-01 NOTE — PROGRESS NOTES
Subjective     Patient ID: Chapincito Burns is a 53 y.o. female.    Chief Complaint: Follow-up    CC: weight    Pt here today for follow-up. Has lost 7.4 lbs (-1.3 lbs muscle. 4.7 lbs fat). Was to start 1000 dianna pb meal planner, exercise and started topiramate 25 mg BID. Denies SE. Does feel like her appetite is down.   States she has been cutting back on snacks and fried foods. Has not used planner.   Has been going to water therapy and pool 4 times a week.     New BMR: 1480    New PBF: 55.8%       Initial:   BMR: 1506    PBF:  56.1%      History of medication for loss:   checked today. Either phentermine or Phen-fen. Contrave- had HAs for 4 days, so stopped it.           Review of Systems       Objective   BP (!) 142/89   Pulse 92   Wt 116.3 kg (256 lb 8 oz)   SpO2 95%   BMI 51.81 kg/m²     Physical Exam  Vitals reviewed.   Constitutional:       General: She is not in acute distress.     Appearance: She is well-developed.   HENT:      Head: Normocephalic and atraumatic.   Eyes:      General: No scleral icterus.     Pupils: Pupils are equal, round, and reactive to light.   Cardiovascular:      Rate and Rhythm: Normal rate.   Pulmonary:      Effort: Pulmonary effort is normal. No respiratory distress.      Breath sounds: Normal breath sounds.   Musculoskeletal:         General: Normal range of motion.      Comments: Wearing knee brace   Skin:     General: Skin is warm and dry.      Findings: No erythema.   Neurological:      Mental Status: She is alert and oriented to person, place, and time.      Cranial Nerves: No cranial nerve deficit.   Psychiatric:         Behavior: Behavior normal.         Judgment: Judgment normal.            Assessment and Plan     1. Class 3 severe obesity with serious comorbidity and body mass index (BMI) of 50.0 to 59.9 in adult, unspecified obesity type    Other orders  -     topiramate (TOPAMAX) 25 MG tablet; Take 1 tablet (25 mg total) by mouth 2 (two) times daily.  Dispense:  180 tablet; Refill: 0      Chapincito was seen today for follow-up.    Diagnoses and all orders for this visit:    Class 3 severe obesity with serious comorbidity and body mass index (BMI) of 50.0 to 59.9 in adult, unspecified obesity type    Other orders  -     topiramate (TOPAMAX) 25 MG tablet; Take 1 tablet (25 mg total) by mouth 2 (two) times daily.        Patient was informed that topiramate is used for migraine prevention and seizures. Weight loss is a common side effect that is well documented. S/he understands this. S/he was informed of the potential side effects such as serious and possibly fatal rash in which case the medication should be discontinued immediately. Paresthesias, forgetfulness, fatigue, kidney stones, GI symptoms, and changes in lab values such as electrolytes, blood counts and kidney function.    Start topiramate 25mg  morning and evening. Pt will contact clinic if she needs an increase before next appt.       Increase low impact activity as tolerated.  Avoid high impact activity, very heavy lifting or other exercise regimens that may cause discomfort.     Add some type of resistance training 2-3 days a week. These can be body weight exercises, light weight or elastic bands. [employer] and [employer] are great sources for free work out plans and videos.     1000 dianna pb meal planner, meal ideas and exercise handouts given previously    Hurricane tips given         No follow-ups on file.

## 2024-07-02 NOTE — PROGRESS NOTES
[facility]   Physical Therapy Updated  POC Note      Name: Chapincito Burns  Clinic Number: [id number]    Therapy Diagnosis:   Encounter Diagnosis   Name Primary?    Chronic pain of both knees Yes     Physician: Karlo Singh III, *    Visit Date: 7/3/2024    Physician Orders: PT Eval and Treat /Aquatic Therapy  Medical Diagnosis from Referral: M17.0 (ICD-10-CM) - Arthritis of both knees  Evaluation Date: 2024  Authorization Period Expiration: 2025  Plan of Care Expiration:8/15/2025  Visit # / Visits authorized:  + Eval    Precautions: Standard and Fall    Time In: 10:32 AM  Time Out: 11:30 AM  Total Billable Time: 33 minutes    SUBJECTIVE   Pt reports: that her right knee is doing better overall and she doesn't have any knee pain at all in that knee, however the left continues to be aggravated when she straightens it and she is unable to straighten it fully. Standing and walking tolerance has improved, as well as gait speed. She was able to go shopping at [employer] recently, however left knee pain still becomes aggravated by prolong standing and walking greater than 5 minutes. She feels that she is getting stronger, and she is doing independent pool exercises two times per week in addition to aquatic therapy.     Chapincito was compliant with home exercise program.     Response to previous treatment: no adverse effects     Functional change: improved endurance     Pain: 3/10  Location: bilateral knee      OBJECTIVE      TU seconds (waddling gait pattern); Updated 7/3/24- 12 seconds     5 Times Sit to Stand: 14 seconds; Updated 7/3/24- 12 seconds     Posture Alignment: Left knee brace; increased kyphosis;trunk deviated right;forward head, decreased weight bearing through left lower extremity     GAIT DEVIATIONS: Chapincito Nj displays flexed posturing;antalgic gait;decreased step length;decreased weight shift, decreased bilateral knee extension during terminal stance  and walks with bilateral crutches     Range of Motion:  Updated 7/3/24-   Knee Left active Left Passive   Flexion 92 deg P! 95 deg P!   Extension + 35 deg P! + 32 deg P!      Knee Right active Right Passive   Flexion 10 deg 110 deg   Extension + 8 deg + 5 deg         Lower Extremity Strength:  Updated 7/3/24-   Right LE   Left LE     Knee extension: 4/5 Knee extension: 4/5   Knee flexion: 4/5 Knee flexion: 4/5   Hip flexion: 4/5 Hip flexion: 4/5   Hip extension:  4-/5 Hip extension: 4-/5   Hip abduction: 4/5 Hip abduction: 4/5   Hip adduction: 4/5 Hip adduction 4/5   Ankle dorsiflexion: 4+/5 Ankle dorsiflexion: 4+/5   Ankle plantarflexion: 5/5 Ankle plantarflexion: 5/5      Special Tests:    Right Left   Valgus Stress Test Negative Positive   Varus Stress test Negative Positive   Lachman's test Negative Negative   Posterior Drawer Negative Negative   Anterior Drawer Negative Negative   Evonne's Test Negative Positive   Apley's Compression Negative Positive      Updated 7/3/24   Squat: Positive left knee  Single leg balance: Positive R 2 seconds; L 2 seconds; Updated 7/3/24 R 3 seconds, 2 seconds     Joint Mobility:                Patellar sup./inf: Dec. Bilaterally              Patellar med/lat: Lateral glide decreased left knee     Palpation: Right knee medial joint line; left medial/lateral joint line, left patellar tendon, left pes anserine, left distal hamstring attachment     Flexibility:               Chrissie's test: R = + ; L = +              Tal test: R = + ; L = +    TREATMENT      Chapincito received aquatic therapeutic exercises to develop strength, endurance, ROM, flexibility, posture, and core stabilization for 57 minutes including:     FUNCTIONAL MOBILITY TRAINING x 2 laps each at beginning and 1 lap each at end of session  Walk forward/backward/lateral     STRETCHES 2 x 30sec  Calf stretch   Quad stretch at stool     LE EX x 30 w/ GTB  Squat  Heel raise with gluteal set on stairs/ladder  Hip  "abduction/adduction  Hip flex/ext  Clam     Seated on Pool stool with GTB  Sit to stand   LAQ      x25  Fwd step ups on 8" step w/contralateral hip flexion   Lateral step ups on 8 " step  Step down taps on 4" step      Walking marches x 2 laps no UE support     UE EX/CORE  x 30   Shoulder flex/ext TA w/ Yellow Dumbbells  Shoulder horizontal abd/add TA activation w/ Yellow Dumbbells  Shoulder abd/add with TA activation w/ Yellow Dumbbells      Mini squat with push/pull BLUE kickboard      ENDURANCE  LTR x 3'  Bicycle in // bars x 3'    ASSESSMENT   Patient presents having attended 12 aquatic therapy visits and subjectively reports complete resolution of right knee pain, as well as increased gait speed and tolerance to prolong standing and community ambulation. Physical re-examination reveals increased TUG and 5X Sit to Stand scores; increased and pain free gross right knee ROM; increased left knee flexion; increased bilateral hip flexion MMT strength; increased right lower extremity single leg stance; and a now negative Apley's Compression test. All other objective findings remain the same as initial evaluation including painful and limited left knee ROM, and gait abnormalities. Patient has met over 60% of all short-term goals and is progressing gradually, but well towards all remaining POC goals. Patient will benefit from continued skilled aquatic therapy to allow for maximal functional return via and advanced HEP with intension to discharge to independent management or transition to land if appropriate.     New Short Term Goals Status:  < 50% met, continue per POC  Long Term Goal Status: continue per initial plan of care.  Reasons for Recertification of Therapy: to reach remaining STG/LTG and allow for maximal functional return    GOALS  Short Term Goals: 6 weeks   1. Patient will be independent in HEP & progressions. Met 7/3/24  2. Patient will achieve TUG score of 12 sec to demonstrate improved mobility Met " 7/3/24  3. The patient will achieve 5 sit to stand score of 11.4 seconds to demonstrate improved transfers and endurance. Appropriate & Ongoing     Long Term Goals: 12 weeks   1. The patient will demonstrate independence with extensive HEP. Appropriate & Ongoing  2. Patient will achieve TUG score of 8 sec to demonstrate improved mobility.Appropriate & Ongoing  3. Patent is able to demonstrate MMT 4/5 on all lower quarter MMT without pain reports during testing. Appropriate & Ongoing    PLAN      Updated Certification Period: 7/1/24 to 8/15/24   Recommended Treatment Plan: 1-2 times per week for 4-6 weeks:  Aquatic Therapy    Jennyfer Cedeno, PT

## 2024-07-03 ENCOUNTER — CLINICAL SUPPORT (OUTPATIENT)
Dept: REHABILITATION | Facility: HOSPITAL | Age: 54
End: 2024-07-03
Payer: COMMERCIAL

## 2024-07-03 DIAGNOSIS — M25.562 CHRONIC PAIN OF BOTH KNEES: Primary | ICD-10-CM

## 2024-07-03 DIAGNOSIS — G89.29 CHRONIC PAIN OF BOTH KNEES: Primary | ICD-10-CM

## 2024-07-03 DIAGNOSIS — M25.561 CHRONIC PAIN OF BOTH KNEES: Primary | ICD-10-CM

## 2024-07-03 PROCEDURE — 97113 AQUATIC THERAPY/EXERCISES: CPT

## 2024-07-03 NOTE — PLAN OF CARE
[facility]   Physical Therapy Updated  POC Note      Name: Chapincito Burns  Clinic Number: [id number]    Therapy Diagnosis:   Encounter Diagnosis   Name Primary?    Chronic pain of both knees Yes     Physician: Karlo Singh III, *    Visit Date: 7/3/2024    Physician Orders: PT Eval and Treat /Aquatic Therapy  Medical Diagnosis from Referral: M17.0 (ICD-10-CM) - Arthritis of both knees  Evaluation Date: 2024  Authorization Period Expiration: 2025  Plan of Care Expiration:8/15/2025  Visit # / Visits authorized:  + Eval    Precautions: Standard and Fall    Time In: 10:32 AM  Time Out: 11:30 AM  Total Billable Time: 33 minutes    SUBJECTIVE   Pt reports: that her right knee is doing better overall and she doesn't have any knee pain at all in that knee, however the left continues to be aggravated when she straightens it and she is unable to straighten it fully. Standing and walking tolerance has improved, as well as gait speed. She was able to go shopping at [employer] recently, however left knee pain still becomes aggravated by prolong standing and walking greater than 5 minutes. She feels that she is getting stronger, and she is doing independent pool exercises two times per week in addition to aquatic therapy.     Chapincito was compliant with home exercise program.     Response to previous treatment: no adverse effects     Functional change: improved endurance     Pain: 3/10  Location: bilateral knee      OBJECTIVE      TU seconds (waddling gait pattern); Updated 7/3/24- 12 seconds     5 Times Sit to Stand: 14 seconds; Updated 7/3/24- 12 seconds     Posture Alignment: Left knee brace; increased kyphosis;trunk deviated right;forward head, decreased weight bearing through left lower extremity     GAIT DEVIATIONS: Chapincito Nj displays flexed posturing;antalgic gait;decreased step length;decreased weight shift, decreased bilateral knee extension during terminal stance  and walks with bilateral crutches     Range of Motion:  Updated 7/3/24-   Knee Left active Left Passive   Flexion 92 deg P! 95 deg P!   Extension + 35 deg P! + 32 deg P!      Knee Right active Right Passive   Flexion 10 deg 110 deg   Extension + 8 deg + 5 deg         Lower Extremity Strength:  Updated 7/3/24-   Right LE   Left LE     Knee extension: 4/5 Knee extension: 4/5   Knee flexion: 4/5 Knee flexion: 4/5   Hip flexion: 4/5 Hip flexion: 4/5   Hip extension:  4-/5 Hip extension: 4-/5   Hip abduction: 4/5 Hip abduction: 4/5   Hip adduction: 4/5 Hip adduction 4/5   Ankle dorsiflexion: 4+/5 Ankle dorsiflexion: 4+/5   Ankle plantarflexion: 5/5 Ankle plantarflexion: 5/5      Special Tests:    Right Left   Valgus Stress Test Negative Positive   Varus Stress test Negative Positive   Lachman's test Negative Negative   Posterior Drawer Negative Negative   Anterior Drawer Negative Negative   Evonne's Test Negative Positive   Apley's Compression Negative Positive      Updated 7/3/24   Squat: Positive left knee  Single leg balance: Positive R 2 seconds; L 2 seconds; Updated 7/3/24 R 3 seconds, 2 seconds     Joint Mobility:                Patellar sup./inf: Dec. Bilaterally              Patellar med/lat: Lateral glide decreased left knee     Palpation: Right knee medial joint line; left medial/lateral joint line, left patellar tendon, left pes anserine, left distal hamstring attachment     Flexibility:               Chrissie's test: R = + ; L = +              Tal test: R = + ; L = +    TREATMENT      Chapincito received aquatic therapeutic exercises to develop strength, endurance, ROM, flexibility, posture, and core stabilization for 57 minutes including:     FUNCTIONAL MOBILITY TRAINING x 2 laps each at beginning and 1 lap each at end of session  Walk forward/backward/lateral     STRETCHES 2 x 30sec  Calf stretch   Quad stretch at stool     LE EX x 30 w/ GTB  Squat  Heel raise with gluteal set on stairs/ladder  Hip  "abduction/adduction  Hip flex/ext  Clam     Seated on Pool stool with GTB  Sit to stand   LAQ      x25  Fwd step ups on 8" step w/contralateral hip flexion   Lateral step ups on 8 " step  Step down taps on 4" step      Walking marches x 2 laps no UE support     UE EX/CORE  x 30   Shoulder flex/ext TA w/ Yellow Dumbbells  Shoulder horizontal abd/add TA activation w/ Yellow Dumbbells  Shoulder abd/add with TA activation w/ Yellow Dumbbells      Mini squat with push/pull BLUE kickboard      ENDURANCE  LTR x 3'  Bicycle in // bars x 3'    ASSESSMENT   Patient presents having attended 12 aquatic therapy visits and subjectively reports complete resolution of right knee pain, as well as increased gait speed and tolerance to prolong standing and community ambulation. Physical re-examination reveals increased TUG and 5X Sit to Stand scores; increased and pain free gross right knee ROM; increased left knee flexion; increased bilateral hip flexion MMT strength; increased right lower extremity single leg stance; and a now negative Apley's Compression test. All other objective findings remain the same as initial evaluation including painful and limited left knee ROM, and gait abnormalities. Patient has met over 60% of all short-term goals and is progressing gradually, but well towards all remaining POC goals. Patient will benefit from continued skilled aquatic therapy to allow for maximal functional return via and advanced HEP with intension to discharge to independent management or transition to land if appropriate.     New Short Term Goals Status:  < 50% met, continue per POC  Long Term Goal Status: continue per initial plan of care.  Reasons for Recertification of Therapy: to reach remaining STG/LTG and allow for maximal functional return    GOALS  Short Term Goals: 6 weeks   1. Patient will be independent in HEP & progressions. Met 7/3/24  2. Patient will achieve TUG score of 12 sec to demonstrate improved mobility Met " 7/3/24  3. The patient will achieve 5 sit to stand score of 11.4 seconds to demonstrate improved transfers and endurance. Appropriate & Ongoing     Long Term Goals: 12 weeks   1. The patient will demonstrate independence with extensive HEP. Appropriate & Ongoing  2. Patient will achieve TUG score of 8 sec to demonstrate improved mobility.Appropriate & Ongoing  3. Patent is able to demonstrate MMT 4/5 on all lower quarter MMT without pain reports during testing. Appropriate & Ongoing    PLAN      Updated Certification Period: 7/1/24 to 8/15/24   Recommended Treatment Plan: 1-2 times per week for 4-6 weeks:  Aquatic Therapy    Jennyfer Cedeno, PT

## 2024-07-05 ENCOUNTER — CLINICAL SUPPORT (OUTPATIENT)
Dept: REHABILITATION | Facility: HOSPITAL | Age: 54
End: 2024-07-05
Payer: COMMERCIAL

## 2024-07-05 DIAGNOSIS — M25.562 CHRONIC PAIN OF BOTH KNEES: Primary | ICD-10-CM

## 2024-07-05 DIAGNOSIS — G89.29 CHRONIC PAIN OF BOTH KNEES: Primary | ICD-10-CM

## 2024-07-05 DIAGNOSIS — M25.561 CHRONIC PAIN OF BOTH KNEES: Primary | ICD-10-CM

## 2024-07-05 PROCEDURE — 97113 AQUATIC THERAPY/EXERCISES: CPT

## 2024-07-05 NOTE — PROGRESS NOTES
"[facility]   Physical Therapy Updated  POC Note      Name: Chapincito Burns  Clinic Number: [id number]    Therapy Diagnosis:   No diagnosis found.    Physician: Karlo Snigh III, *    Visit Date: 7/5/2024    Physician Orders: PT Eval and Treat /Aquatic Therapy  Medical Diagnosis from Referral: M17.0 (ICD-10-CM) - Arthritis of both knees  Evaluation Date: 5/1/2024  Authorization Period Expiration: 4/26/2025  Plan of Care Expiration:8/15/2025  Visit # / Visits authorized: 13/20 + Eval    Precautions: Standard and Fall    Time In: 11:31 AM  Time Out: 12:33 PM  Total Billable Time: 61 minutes    SUBJECTIVE   Pt reports: that she has been doing exercises in the pool regularly when taking her niece to the local pool.     Chapincito was compliant with home exercise program.     Response to previous treatment: no adverse effects     Functional change: improved endurance     Pain: 3/10  Location: bilateral knee      OBJECTIVE        TREATMENT      Chapincito received aquatic therapeutic exercises to develop strength, endurance, ROM, flexibility, posture, and core stabilization for 61 minutes including:     FUNCTIONAL MOBILITY TRAINING x 2 laps each at beginning and 1 lap each at end of session  Walk forward/backward/lateral     STRETCHES 2 x 30sec  Calf stretch   Quad stretch at stool     LE EX x 30 w/ GTB  Squat  Heel raise with gluteal set on stairs/ladder  Hip abduction/adduction  Hip flex/ext  Clam     Seated on Pool stool with GTB  Sit to stand   LAQ > replaced with forward lunge at stairs     x25  Fwd step ups on 8" step w/contralateral hip flexion   Lateral step ups on 8 " step  Step down taps on 4" step      Walking marches x 2 laps no UE support (Add forward press     UE EX/CORE  x 30   Shoulder flex/ext TA w/ Yellow Dumbbells  Shoulder horizontal abd/add TA activation w/ Yellow Dumbbells  Shoulder abd/add with TA activation w/ Yellow Dumbbells      Mini squat with push/pull BLUE kickboard "      ENDURANCE  LTR x 3'  Bicycle in // bars x 3'    ASSESSMENT   Patient able to advance quad strengthening to functional side stepping and tolerated this progression well. However, intermittent minimal visual cues necessary to reduce tendency to step with knee valgus alignment. During dynamic gait stability training patient displays no loss of balance throughout all training, therefore consider adding additional perturbation at next session. Continue to progress as tolerated.      GOALS  Short Term Goals: 6 weeks   1. Patient will be independent in HEP & progressions. Met 7/3/24  2. Patient will achieve TUG score of 12 sec to demonstrate improved mobility Met 7/3/24  3. The patient will achieve 5 sit to stand score of 11.4 seconds to demonstrate improved transfers and endurance. Appropriate & Ongoing     Long Term Goals: 12 weeks   1. The patient will demonstrate independence with extensive HEP. Appropriate & Ongoing  2. Patient will achieve TUG score of 8 sec to demonstrate improved mobility.Appropriate & Ongoing  3. Patent is able to demonstrate MMT 4/5 on all lower quarter MMT without pain reports during testing. Appropriate & Ongoing    PLAN      Updated Certification Period: 7/1/24 to 8/15/24   Recommended Treatment Plan: 1-2 times per week for 4-6 weeks:  Aquatic Therapy    Jennyfer Cedeno, PT

## 2024-07-10 ENCOUNTER — CLINICAL SUPPORT (OUTPATIENT)
Dept: REHABILITATION | Facility: HOSPITAL | Age: 54
End: 2024-07-10
Payer: COMMERCIAL

## 2024-07-10 DIAGNOSIS — G89.29 CHRONIC PAIN OF BOTH KNEES: Primary | ICD-10-CM

## 2024-07-10 DIAGNOSIS — M25.562 CHRONIC PAIN OF BOTH KNEES: Primary | ICD-10-CM

## 2024-07-10 DIAGNOSIS — M25.561 CHRONIC PAIN OF BOTH KNEES: Primary | ICD-10-CM

## 2024-07-10 PROCEDURE — 97113 AQUATIC THERAPY/EXERCISES: CPT

## 2024-07-10 NOTE — PROGRESS NOTES
"[facility]   Physical Therapy Updated  POC Note      Name: Chapincito Burns  Clinic Number: [id number]    Therapy Diagnosis:   Encounter Diagnosis   Name Primary?    Chronic pain of both knees Yes     Physician: Karlo Singh III, *    Visit Date: 7/10/2024    Physician Orders: PT Eval and Treat /Aquatic Therapy  Medical Diagnosis from Referral: M17.0 (ICD-10-CM) - Arthritis of both knees  Evaluation Date: 5/1/2024  Authorization Period Expiration: 4/26/2025  Plan of Care Expiration: 8/15/2025  Visit # / Visits authorized: 14/20 + Eval    Precautions: Standard and Fall    Time In: 9:27 AM  Time Out: 10:30 PM  Total Billable Time: 57 minutes    SUBJECTIVE   Pt reports: that her knees are pretty sore from doing a lot of standing and walking the last few days. She's in more pain than usual, especially in the right knee.    Chapincito was compliant with home exercise program.     Response to previous treatment: no adverse effects     Functional change: improved endurance     Pain: 4/10  Location: bilateral knee      OBJECTIVE        TREATMENT      Chapincito received aquatic therapeutic exercises to develop strength, endurance, ROM, flexibility, posture, and core stabilization for 57 minutes including:     FUNCTIONAL MOBILITY TRAINING x 2 laps each at beginning and 1 lap each at end of session  Walk forward/backward/lateral     STRETCHES 2 x 30sec  Calf stretch   Quad stretch at stool     LE EX x 30 w/ GTB  Squat  Heel raise with gluteal set on stairs/ladder  Hip abduction/adduction  Hip flex/ext  Clam     Seated on Pool stool with GTB  Sit to stand   LAQ > replaced with forward lunge at ladder/stairs     x25  Fwd step ups on 8" step w/contralateral hip flexion   Lateral step ups on 8 " step  Step down taps on 4" step      Walking marches x 2 laps no UE support (Add forward press 3.3 #)      UE EX/CORE  x 30   Shoulder flex/ext TA w/ Yellow Dumbbells  Shoulder horizontal abd/add TA activation " w/ Yellow Dumbbells  Shoulder abd/add with TA activation w/ Yellow Dumbbells      Mini squat with push/pull BLUE kickboard      ENDURANCE  LTR x 2'  Bicycle in // bars x 2'    ASSESSMENT   Patient presents with increased knee pain after walking and standing a lot over the last few days. Provided patient's good progress with dynamic marching gait training a weighted forward press was added for additional functional core strengthening. Patient requires verbal cueing during lunges to reduce anterior tibial translation. Continue to progress as tolerated.      GOALS  Short Term Goals: 6 weeks   1. Patient will be independent in HEP & progressions. Met 7/3/24  2. Patient will achieve TUG score of 12 sec to demonstrate improved mobility Met 7/3/24  3. The patient will achieve 5 sit to stand score of 11.4 seconds to demonstrate improved transfers and endurance. Appropriate & Ongoing     Long Term Goals: 12 weeks   1. The patient will demonstrate independence with extensive HEP. Appropriate & Ongoing  2. Patient will achieve TUG score of 8 sec to demonstrate improved mobility.Appropriate & Ongoing  3. Patent is able to demonstrate MMT 4/5 on all lower quarter MMT without pain reports during testing. Appropriate & Ongoing    PLAN      Updated Certification Period: 7/1/24 to 8/15/24   Recommended Treatment Plan: 1-2 times per week for 4-6 weeks:  Aquatic Therapy    Jennyfer Cedeno, PT

## 2024-07-12 ENCOUNTER — CLINICAL SUPPORT (OUTPATIENT)
Dept: REHABILITATION | Facility: HOSPITAL | Age: 54
End: 2024-07-12
Payer: COMMERCIAL

## 2024-07-12 DIAGNOSIS — M25.561 CHRONIC PAIN OF BOTH KNEES: Primary | ICD-10-CM

## 2024-07-12 DIAGNOSIS — M25.562 CHRONIC PAIN OF BOTH KNEES: Primary | ICD-10-CM

## 2024-07-12 DIAGNOSIS — G89.29 CHRONIC PAIN OF BOTH KNEES: Primary | ICD-10-CM

## 2024-07-12 PROCEDURE — 97113 AQUATIC THERAPY/EXERCISES: CPT

## 2024-07-12 NOTE — PROGRESS NOTES
"[facility]   Physical Therapy Updated  POC Note      Name: Chapincito Burns  Clinic Number: [id number]    Therapy Diagnosis:   Encounter Diagnosis   Name Primary?    Chronic pain of both knees Yes       Physician: Karlo Singh III, *    Visit Date: 7/12/2024    Physician Orders: PT Eval and Treat /Aquatic Therapy  Medical Diagnosis from Referral: M17.0 (ICD-10-CM) - Arthritis of both knees  Evaluation Date: 5/1/2024  Authorization Period Expiration: 4/26/2025  Plan of Care Expiration: 8/15/2025  Visit # / Visits authorized: 14/20 + Eval    Precautions: Standard and Fall    Time In: 8:30 AM  Time Out: 9:33 PM  Total Billable Time: 33 minutes    SUBJECTIVE   Pt reports: That she's back to baseline pain levels. She feels that she is doing well and knees are feeling stronger.    Chapincito was compliant with home exercise program.     Response to previous treatment: no adverse effects     Functional change: improved endurance     Pain: 2 /10  Location: bilateral knee      OBJECTIVE        TREATMENT      Chapincito received aquatic therapeutic exercises to develop strength, endurance, ROM, flexibility, posture, and core stabilization for 60 minutes including:     FUNCTIONAL MOBILITY TRAINING x 2 laps each at beginning and 1 lap each at end of session  Walk forward/backward/lateral     STRETCHES 2 x 30 sec  Calf stretch   Quad stretch at stool     LE EX x 30 w/ GTB  Squat  Heel raise with gluteal set on stairs/ladder  Hip abduction/adduction  Hip flex/ext  Clam     Seated on Pool stool with GTB  Sit to stand   LAQ > replaced with forward lunge at ladder/stairs     x25  Fwd step ups on 8" step w/contralateral hip flexion   Lateral step ups on 8 " step w/contralateral hip flexion  Step down taps on 4" step      Walking marches x 2 laps no UE support (Add forward press 3.3 #)      UE EX/CORE  x 30   Shoulder flex/ext TA w/ Yellow Dumbbells  Shoulder horizontal abd/add TA activation w/ Yellow " Dumbbells  Shoulder abd/add with TA activation w/ Yellow Dumbbells      Mini squat with push/pull BLUE kickboard      ENDURANCE  LTR x 2'  Bicycle in // bars x 2'    ASSESSMENT   In order to improve patient's eccentric hip control all standing single leg stance hip strengthening performed without placing foot at rest between repetitions. Consider removing all upper extremity support at next treatment. For additional lateral hip stability in single leg stance patient performed lateral step ups with contralateral hip flexion. Initial losses of balance noted, but became less frequent with repetition. Continue to progress as tolerated.      GOALS  Short Term Goals: 6 weeks   1. Patient will be independent in HEP & progressions. Met 7/3/24  2. Patient will achieve TUG score of 12 sec to demonstrate improved mobility Met 7/3/24  3. The patient will achieve 5 sit to stand score of 11.4 seconds to demonstrate improved transfers and endurance. Appropriate & Ongoing     Long Term Goals: 12 weeks   1. The patient will demonstrate independence with extensive HEP. Appropriate & Ongoing  2. Patient will achieve TUG score of 8 sec to demonstrate improved mobility.Appropriate & Ongoing  3. Patent is able to demonstrate MMT 4/5 on all lower quarter MMT without pain reports during testing. Appropriate & Ongoing    PLAN      Updated Certification Period: 7/1/24 to 8/15/24   Recommended Treatment Plan: 1-2 times per week for 4-6 weeks:  Aquatic Therapy    Jennyfer Cedeno, PT

## 2024-07-17 ENCOUNTER — CLINICAL SUPPORT (OUTPATIENT)
Dept: REHABILITATION | Facility: HOSPITAL | Age: 54
End: 2024-07-17
Payer: COMMERCIAL

## 2024-07-17 DIAGNOSIS — M25.562 CHRONIC PAIN OF BOTH KNEES: Primary | ICD-10-CM

## 2024-07-17 DIAGNOSIS — G89.29 CHRONIC PAIN OF BOTH KNEES: Primary | ICD-10-CM

## 2024-07-17 DIAGNOSIS — M25.561 CHRONIC PAIN OF BOTH KNEES: Primary | ICD-10-CM

## 2024-07-17 PROCEDURE — 97113 AQUATIC THERAPY/EXERCISES: CPT

## 2024-07-17 NOTE — PROGRESS NOTES
"[facility]   Physical Therapy Updated  POC Note      Name: Chapincito Burns  Clinic Number: [id number]    Therapy Diagnosis:   Encounter Diagnosis   Name Primary?    Chronic pain of both knees Yes     Physician: Karlo Singh III, *    Visit Date: 2024    Physician Orders: PT Eval and Treat /Aquatic Therapy  Medical Diagnosis from Referral: M17.0 (ICD-10-CM) - Arthritis of both knees  Evaluation Date: 2024  Authorization Period Expiration: 2025  Plan of Care Expiration: 8/15/2025  Visit # / Visits authorized: 15/20 + Eval    Precautions: Standard and Fall    Time In: 8:30 AM  Time Out: 9:47 AM  Total Billable Time: 73 minutes    SUBJECTIVE   Pt reports: minimal pain this morning.     Chapincito was compliant with home exercise program.     Response to previous treatment: no adverse effects     Functional change: improved endurance     Pain: 2/10  Location: bilateral knee      OBJECTIVE        TREATMENT      Chapincito received aquatic therapeutic exercises to develop strength, endurance, ROM, flexibility, posture, and core stabilization for 73 minutes includin.5# Ankle weights    FUNCTIONAL MOBILITY TRAINING x 2 laps each at beginning and 1 lap each at end of session  Walk forward/backward/lateral     STRETCHES 2 x 30 sec  Calf stretch   Quad stretch at stool     LE EX x 30 w/ GTB  Squat  Heel raise with gluteal set on stairs/ladder  Hip abduction/adduction  Hip flex/ext  Clam     Seated on Pool stool with GTB  Sit to stand   LAQ > replaced with forward lunge at ladder/stairs     x25  Fwd step ups on 8" step w/contralateral hip flexion   Lateral step ups on 8 " step w/contralateral hip flexion  Step down taps on 4" step      Walking marches x 2 laps no UE support (Add forward press 3.3 #)      UE EX/CORE  x 30   Shoulder flex/ext TA w/ Yellow Dumbbells  Shoulder horizontal abd/add TA activation w/ Yellow Dumbbells  Shoulder abd/add with TA activation w/ Yellow " Dumbbells      Mini squat with push/pull BLUE kickboard      ENDURANCE  LTR x 2'  Bicycle in // bars x 2'    ASSESSMENT   Patient shows continued progression with all lower quarter strengthening and has reached max resistance with all lower quarter exercises. Therefore light weight ankle weights were incorporated to continue progressing global knee strength and stability. Patient experiencing slight left knee pain when performing step downs on left side, but able to reduce pain provocation with increased upper extremity support. Continue to progress as tolerated.      GOALS  Short Term Goals: 6 weeks   1. Patient will be independent in HEP & progressions. Met 7/3/24  2. Patient will achieve TUG score of 12 sec to demonstrate improved mobility Met 7/3/24  3. The patient will achieve 5 sit to stand score of 11.4 seconds to demonstrate improved transfers and endurance. Appropriate & Ongoing     Long Term Goals: 12 weeks   1. The patient will demonstrate independence with extensive HEP. Appropriate & Ongoing  2. Patient will achieve TUG score of 8 sec to demonstrate improved mobility.Appropriate & Ongoing  3. Patent is able to demonstrate MMT 4/5 on all lower quarter MMT without pain reports during testing. Appropriate & Ongoing    PLAN      Updated Certification Period: 7/1/24 to 8/15/24   Recommended Treatment Plan: 1-2 times per week for 4-6 weeks:  Aquatic Therapy    Jennyfer Cedeno, PT

## 2024-07-19 ENCOUNTER — CLINICAL SUPPORT (OUTPATIENT)
Dept: REHABILITATION | Facility: HOSPITAL | Age: 54
End: 2024-07-19
Payer: COMMERCIAL

## 2024-07-19 DIAGNOSIS — M25.562 CHRONIC PAIN OF BOTH KNEES: Primary | ICD-10-CM

## 2024-07-19 DIAGNOSIS — G89.29 CHRONIC PAIN OF BOTH KNEES: Primary | ICD-10-CM

## 2024-07-19 DIAGNOSIS — M25.561 CHRONIC PAIN OF BOTH KNEES: Primary | ICD-10-CM

## 2024-07-19 PROCEDURE — 97113 AQUATIC THERAPY/EXERCISES: CPT

## 2024-07-19 NOTE — PROGRESS NOTES
"[facility]   Physical Therapy Updated  POC Note      Name: Chapincito Burns  Clinic Number: [id number]    Therapy Diagnosis:   Encounter Diagnosis   Name Primary?    Chronic pain of both knees Yes       Physician: Karlo Singh III, *    Visit Date: 2024    Physician Orders: PT Eval and Treat /Aquatic Therapy  Medical Diagnosis from Referral: M17.0 (ICD-10-CM) - Arthritis of both knees  Evaluation Date: 2024  Authorization Period Expiration: 2025  Plan of Care Expiration: 8/15/2025  Visit # / Visits authorized:  + Eval    Precautions: Standard and Fall    Time In: 8:30 AM  Time Out: 9:41 AM  Total Billable Time: 34 minutes    SUBJECTIVE   Pt reports: that she's doing well and knee pain typically stays at 2/10. She plans to join [employer] to be able to use the pool and also using HEP to sustain her current level of progress.      Chapincito was compliant with home exercise program.     Response to previous treatment: no adverse effects     Functional change: improved endurance     Pain: 2/10  Location: bilateral knee      OBJECTIVE        TREATMENT      Chapincito received aquatic therapeutic exercises to develop strength, endurance, ROM, flexibility, posture, and core stabilization for 62 minutes includin.5# Ankle weights    FUNCTIONAL MOBILITY TRAINING x 2 laps each at beginning and 1 lap each at end of session  Walk forward/backward/lateral     STRETCHES 2 x 30 sec  Calf stretch   Quad stretch at stool     LE EX x 30 w/ GTB  Squat  Heel raise with gluteal set on stairs/ladder  Hip abduction/adduction  Hip flex/ext  Clam     Seated on Pool stool with GTB  Sit to stand   LAQ > replaced with forward lunge at ladder/stairs     x25  Fwd step ups on 8" step w/contralateral hip flexion   Lateral step ups on 8 " step w/contralateral hip flexion  Step down taps on 4" step      Lateral resisted walking GTB x 2 laps    Walking marches x 2 laps no UE support (Add " forward press 4.4 #)      UE EX/CORE  x 30   Shoulder flex/ext TA w/ Yellow Dumbbells  Shoulder horizontal abd/add TA activation w/ Yellow Dumbbells  Shoulder abd/add with TA activation w/ Yellow Dumbbells      Mini squat with push/pull BLUE kickboard      ENDURANCE  LTR x 2'  Bicycle in // bars x 2'    ASSESSMENT   Patient requiring moderate visual cues to reduce adductor compensation during lateral resisted stepping. Patient's forward press weight advanced given recent progression level with functional core strength. Patient tolerated progression well, however displays upper extremity fatigue towards the end indicating impaired endurance when carrying moderately weighted objects. Consider increasing ankle weight at next session. Continue to progress as tolerated.      GOALS  Short Term Goals: 6 weeks   1. Patient will be independent in HEP & progressions. Met 7/3/24  2. Patient will achieve TUG score of 12 sec to demonstrate improved mobility Met 7/3/24  3. The patient will achieve 5 sit to stand score of 11.4 seconds to demonstrate improved transfers and endurance. Appropriate & Ongoing     Long Term Goals: 12 weeks   1. The patient will demonstrate independence with extensive HEP. Appropriate & Ongoing  2. Patient will achieve TUG score of 8 sec to demonstrate improved mobility.Appropriate & Ongoing  3. Patent is able to demonstrate MMT 4/5 on all lower quarter MMT without pain reports during testing. Appropriate & Ongoing    PLAN      Updated Certification Period: 7/1/24 to 8/15/24   Recommended Treatment Plan: 1-2 times per week for 4-6 weeks:  Aquatic Therapy    Jennyfer Cedeno, PT

## 2024-07-23 ENCOUNTER — CLINICAL SUPPORT (OUTPATIENT)
Dept: REHABILITATION | Facility: HOSPITAL | Age: 54
End: 2024-07-23
Payer: COMMERCIAL

## 2024-07-23 DIAGNOSIS — M25.561 CHRONIC PAIN OF BOTH KNEES: Primary | ICD-10-CM

## 2024-07-23 DIAGNOSIS — M25.562 CHRONIC PAIN OF BOTH KNEES: Primary | ICD-10-CM

## 2024-07-23 DIAGNOSIS — G89.29 CHRONIC PAIN OF BOTH KNEES: Primary | ICD-10-CM

## 2024-07-23 PROCEDURE — 97113 AQUATIC THERAPY/EXERCISES: CPT

## 2024-07-23 NOTE — PROGRESS NOTES
"[facility]   Physical Therapy Updated  POC Note      Name: Chapincito Burns  Clinic Number: [id number]    Therapy Diagnosis:   Encounter Diagnosis   Name Primary?    Chronic pain of both knees Yes       Physician: Karlo Singh III, *    Visit Date: 2024    Physician Orders: PT Eval and Treat /Aquatic Therapy  Medical Diagnosis from Referral: M17.0 (ICD-10-CM) - Arthritis of both knees  Evaluation Date: 2024  Authorization Period Expiration: 2025  Plan of Care Expiration: 8/15/2025  Visit # / Visits authorized:  + Eval    Precautions: Standard and Fall    Time In: 9:30 AM  Time Out: 10:36 AM  Total Billable Time: 35 minutes    SUBJECTIVE   Pt reports: that her knee pain has been under control. She notices that she walks pretty slow though and wishes she could walk faster.    Chapincito was compliant with home exercise program.     Response to previous treatment: no adverse effects     Functional change: improved endurance     Pain: 2/10  Location: bilateral knee      OBJECTIVE        TREATMENT      Chapincito received aquatic therapeutic exercises to develop strength, endurance, ROM, flexibility, posture, and core stabilization for 65 minutes includin.5# Ankle weights    FUNCTIONAL MOBILITY TRAINING x 2 laps each at beginning and 1 lap each at end of session  Walk forward/backward/lateral     STRETCHES 2 x 30 sec  Calf stretch   Quad stretch at stool  Hip flexor stretch     LE EX x 30 w/ GTB  Squat  Heel raise with gluteal set on stairs/ladder  Hip abduction/adduction  Hip flex/ext  Clam     Seated on Pool stool with GTB  Sit to stand   LAQ > replaced with forward lunge at ladder/stairs     x25  Fwd step ups on 8" step w/contralateral hip flexion   Lateral step ups on 8 " step w/contralateral hip flexion  Step down taps on 4" step      Lateral resisted walking GTB x 2 laps    Walking marches x 2 laps no UE support (Add forward press 4.4 #)      UE EX/CORE  x 30 "   Shoulder flex/ext TA w/ Yellow Dumbbells  Shoulder horizontal abd/add TA activation w/ Yellow Dumbbells  Shoulder abd/add with TA activation w/ Yellow Dumbbells      Mini squat with push/pull BLUE kickboard      ENDURANCE  LTR x 2'  Bicycle in // bars x 2'    ASSESSMENT   Consider increasing ankle weight at next session. To reduce patients impaired terminal hip extension due to decreased hip flexor muscle length a standing hip flexor stretch was added.  Continue to progress as tolerated.      GOALS  Short Term Goals: 6 weeks   1. Patient will be independent in HEP & progressions. Met 7/3/24  2. Patient will achieve TUG score of 12 sec to demonstrate improved mobility Met 7/3/24  3. The patient will achieve 5 sit to stand score of 11.4 seconds to demonstrate improved transfers and endurance. Appropriate & Ongoing     Long Term Goals: 12 weeks   1. The patient will demonstrate independence with extensive HEP. Appropriate & Ongoing  2. Patient will achieve TUG score of 8 sec to demonstrate improved mobility.Appropriate & Ongoing  3. Patent is able to demonstrate MMT 4/5 on all lower quarter MMT without pain reports during testing. Appropriate & Ongoing    PLAN      Updated Certification Period: 7/1/24 to 8/15/24   Recommended Treatment Plan: 1-2 times per week for 4-6 weeks:  Aquatic Therapy    Jennyfer Cedeno, PT

## 2024-07-24 ENCOUNTER — CLINICAL SUPPORT (OUTPATIENT)
Dept: REHABILITATION | Facility: HOSPITAL | Age: 54
End: 2024-07-24
Payer: COMMERCIAL

## 2024-07-24 DIAGNOSIS — G89.29 CHRONIC PAIN OF BOTH KNEES: Primary | ICD-10-CM

## 2024-07-24 DIAGNOSIS — M25.562 CHRONIC PAIN OF BOTH KNEES: Primary | ICD-10-CM

## 2024-07-24 DIAGNOSIS — M25.561 CHRONIC PAIN OF BOTH KNEES: Primary | ICD-10-CM

## 2024-07-24 PROCEDURE — 97113 AQUATIC THERAPY/EXERCISES: CPT

## 2024-07-24 NOTE — PROGRESS NOTES
"[facility]   Physical Therapy Updated  POC Note      Name: Chapincito Burns  Clinic Number: [id number]    Therapy Diagnosis:   Encounter Diagnosis   Name Primary?    Chronic pain of both knees Yes       Physician: Karlo Singh III, *    Visit Date: 2024    Physician Orders: PT Eval and Treat /Aquatic Therapy  Medical Diagnosis from Referral: M17.0 (ICD-10-CM) - Arthritis of both knees  Evaluation Date: 2024  Authorization Period Expiration: 2025  Plan of Care Expiration: 8/15/2025  Visit # / Visits authorized:  + Eval    Precautions: Standard and Fall    Time In: 9:30 AM  Time Out: 10:35 AM  Total Billable Time: 34 minutes    SUBJECTIVE   Pt reports: pain is at its usual low level. She's a little sore from doing PT day prior.    Chapincito was compliant with home exercise program.     Response to previous treatment: no adverse effects     Functional change: improved endurance     Pain: 2/10  Location: bilateral knee      OBJECTIVE        TREATMENT      Chapincito received aquatic therapeutic exercises to develop strength, endurance, ROM, flexibility, posture, and core stabilization for 62 minutes includin# Ankle weights    FUNCTIONAL MOBILITY TRAINING x 2 laps each at beginning and 1 lap each at end of session  Walk forward/backward/lateral     STRETCHES 2 x 30 sec  Calf stretch   Quad stretch at stool  Hip flexor stretch     LE EX x 30 w/ GTB  Squat  Heel raise with gluteal set on stairs/ladder  Hip abduction/adduction  Hip flex/ext  Clam     Seated on Pool stool with GTB  Sit to stand   LAQ > replaced with forward lunge at ladder/stairs     x25  Fwd step ups on 8" step w/contralateral hip flexion   Lateral step ups on 8 " step w/contralateral hip flexion  Step down taps on 4" step      Lateral resisted walking GTB x 2 laps    Walking marches x 2 laps no UE support (Add forward press 4.4 #)      UE EX/CORE  x 30   Shoulder flex/ext TA w/ Yellow " Dumbbells  Shoulder horizontal abd/add TA activation w/ Yellow Dumbbells  Shoulder abd/add with TA activation w/ Yellow Dumbbells      Mini squat with push/pull BLUE kickboard      ENDURANCE  LTR x 2'  Bicycle in // bars x 2'    ASSESSMENT   Patient's global upper and lower quarter strength and endurance continuing to improve with good tolerance to advanced ankle weight resistance. Patient requiring moderate cueing to reduce knee valgus during lateral stepping, and shows good carryover post instruction. Patient's HEP updated to address posterior left knee muscle length limitations that are preventing her from reaching her from reaching end range knee extension. Continue to progress as tolerated.      GOALS  Short Term Goals: 6 weeks   1. Patient will be independent in HEP & progressions. Met 7/3/24  2. Patient will achieve TUG score of 12 sec to demonstrate improved mobility Met 7/3/24  3. The patient will achieve 5 sit to stand score of 11.4 seconds to demonstrate improved transfers and endurance. Appropriate & Ongoing     Long Term Goals: 12 weeks   1. The patient will demonstrate independence with extensive HEP. Appropriate & Ongoing  2. Patient will achieve TUG score of 8 sec to demonstrate improved mobility.Appropriate & Ongoing  3. Patent is able to demonstrate MMT 4/5 on all lower quarter MMT without pain reports during testing. Appropriate & Ongoing    PLAN      Updated Certification Period: 7/1/24 to 8/15/24   Recommended Treatment Plan: 1-2 times per week for 4-6 weeks:  Aquatic Therapy    Jennyfer Cedeno, PT

## 2024-07-26 DIAGNOSIS — M17.0 ARTHRITIS OF BOTH KNEES: ICD-10-CM

## 2024-07-26 RX ORDER — MELOXICAM 15 MG/1
15 TABLET ORAL
Qty: 30 TABLET | Refills: 0 | Status: SHIPPED | OUTPATIENT
Start: 2024-07-26

## 2024-07-29 ENCOUNTER — PATIENT MESSAGE (OUTPATIENT)
Dept: SPORTS MEDICINE | Facility: CLINIC | Age: 54
End: 2024-07-29
Payer: COMMERCIAL

## 2024-07-29 DIAGNOSIS — M17.0 PRIMARY OSTEOARTHRITIS OF KNEES, BILATERAL: Primary | ICD-10-CM

## 2024-08-14 ENCOUNTER — OFFICE VISIT (OUTPATIENT)
Dept: SPORTS MEDICINE | Facility: CLINIC | Age: 54
End: 2024-08-14
Payer: COMMERCIAL

## 2024-08-14 VITALS
BODY MASS INDEX: 51.81 KG/M2 | HEIGHT: 59 IN | SYSTOLIC BLOOD PRESSURE: 132 MMHG | HEART RATE: 92 BPM | DIASTOLIC BLOOD PRESSURE: 82 MMHG

## 2024-08-14 DIAGNOSIS — M17.0 PRIMARY OSTEOARTHRITIS OF KNEES, BILATERAL: Primary | ICD-10-CM

## 2024-08-14 PROCEDURE — 99999 PR PBB SHADOW E&M-EST. PATIENT-LVL III: CPT | Mod: PBBFAC,,, | Performed by: NEUROMUSCULOSKELETAL MEDICINE & OMM

## 2024-08-14 PROCEDURE — 20611 DRAIN/INJ JOINT/BURSA W/US: CPT | Mod: 50,S$GLB,, | Performed by: NEUROMUSCULOSKELETAL MEDICINE & OMM

## 2024-08-14 PROCEDURE — 99499 UNLISTED E&M SERVICE: CPT | Mod: S$GLB,,, | Performed by: NEUROMUSCULOSKELETAL MEDICINE & OMM

## 2024-08-14 NOTE — PROGRESS NOTES
"Subjective:     Chapincito Burns     Chief Complaint   Patient presents with    Right Knee - Pain    Left Knee - Pain       Chapincito is a 53 y.o. female coming in today for their 1st Euflexxa injection to the bilateral knees.   Objective:     VITAL SIGNS: BP (!) 148/92   Pulse 92   Ht 4' 11" (1.499 m)   BMI 51.81 kg/m²      Euflexxa Injection Procedure #1     A time out was performed, including verification of patient ID, procedure, site and side, availability of information and equipment, review of safety issues, and agreement with consent, the procedure site was marked.    Location: Knee joint, bilateral     Procedure: The patient was prepped aseptically with alcohol and chlorhexidine. Ethyl Chloride spray was used prior to skin puncture to help numb the superficial skin. After cold spray was applied, 2 cc's of 0.2% Naropin was injected into the skin and superficial tissue at the injection site using a 22 G, 2.5" needle to form an anesthetic tunnel and ensure proper needle placement into the bilateral knee joint space. Using a hemostat, the syringe was exchanged with the Euflexxa syringe, and 2cc of Euflexxa was injected into the bilateral knee joint. The patient was in the supine position during the duration of this procedure and the injection approach was from the superolateral aspect.     Ultrasound guidance was used for needle localization with SonoSite Edge 2, 9-L MHz linear probe(s). Images were saved and stored for documentation. The bilateral knee joint was well visualized.  Dynamic visualization of the needle(s) was continuous throughout the procedure and maintained good position and correct needle placement.        Patient tolerance: The patient tolerated the procedure well with no immediate complications. There were no adverse reactions to the medication. Patient was instructed to apply ice to the joint for up to 20 minutes at a time and avoid strenuous activities for 24-36 hours following the " injection. The patient was warned of possible blood pressure changes during that time. Following that time, the patient can resume activities as prior to the injection.     The patient was reminded to call the clinic immediately for any adverse side effects as explained in clinic today.    Euflexxa:  Lot: [id number]  Exp: 8/18/25      Assessment:      Encounter Diagnosis   Name Primary?    Primary osteoarthritis of knees, bilateral Yes          Plan:     1.first Euflexxa injections of bilateral knee received today (see procedure note above)  2. Follow-up in 1 week for 2nd injection of 3 injection series  3. Patient agreeable to today's plan and all questions were answered    This note is dictated using the M*Modal Fluency Direct word recognition program. There are word recognition mistakes that are occasionally missed on review.

## 2024-08-14 NOTE — PROGRESS NOTES
"Subjective:     Chapincito Burns     No chief complaint on file.      Chapincito is a 53 y.o. female coming in today for their 1st Euflexxa injection to the bilateral knee.   Objective:     VITAL SIGNS: There were no vitals taken for this visit.     Euflexxa Injection Procedure #1     A time out was performed, including verification of patient ID, procedure, site and side, availability of information and equipment, review of safety issues, and agreement with consent, the procedure site was marked.    Location: Knee joint, {LEFT/RIGHT/BILATERAL:89664}     Procedure: The patient was prepped aseptically with alcohol and chlorhexidine. Ethyl Chloride spray was used prior to skin puncture to help numb the superficial skin. After cold spray was applied, 2 cc's of 0.2% Naropin was injected into the skin and superficial tissue at the injection site using a 21 G, 2" needle to form an anesthetic tunnel and ensure proper needle placement into the {LEFT/RIGHT/BILATERAL:80712} knee joint space. Using a hemostat, the syringe was exchanged with the Euflexxa syringe, and 2cc of Euflexxa was injected into the {LEFT/RIGHT/BILATERAL:99979} knee joint. The patient was in the supine position during the duration of this procedure and the injection approach was from the superolateral aspect.     Ultrasound guidance was used for needle localization with [employer] Edge 2, {Ultrasound Probe:48581} probe(s). Images were saved and stored for documentation. The {LEFT/RIGHT/BILATERAL:66752} knee joint was well visualized.  Dynamic visualization of the needle(s) was continuous throughout the procedure and maintained good position and correct needle placement.        Patient tolerance: The patient tolerated the procedure well with no immediate complications. There were no adverse reactions to the medication. Patient was instructed to apply ice to the joint for up to 20 minutes at a time and avoid strenuous activities for 24-36 hours following the " "injection. The patient was warned of possible blood pressure changes during that time. Following that time, the patient can resume activities as prior to the injection.     The patient was reminded to call the clinic immediately for any adverse side effects as explained in clinic today.    Euflexxa Injection Procedure #1 with aspiration     A time out was performed, including verification of patient ID, procedure, site and side, availability of information and equipment, review of safety issues, and agreement with consent, the procedure site was marked.    Location: Knee joint, {LEFT/RIGHT/BILATERAL:55343}    Procedure: The patient was prepped aseptically with alcohol and chlorhexidine. Ethyl Chloride spray was used prior to skin puncture to help numb the superficial skin. After cold spray was applied, 1 cc's of 1% Lidocaine was injected into the skin and superficial tissue at the injection site using a 25 G, 1.5" needle to form an anesthetic tunnel and ensure proper needle placement into the {LEFT/RIGHT/BILATERAL:17056}  knee joint space. After local anesthetic was applied a 18 G, 3.5 needle was used to enter the {LEFT/RIGHT/BILATERAL:95002} knee joint capsule under US guidance.*** of clear synovial fluid was aspirated. Following aspiration, using a hemostat, the syringe was exchanged with the Euflexxa syringe, and 2cc of Euflexxa was injected into the {LEFT/RIGHT/BILATERAL:44861} knee joint. The patient was in the supine position during the duration of this procedure and the injection approach was from the superolateral aspect.     Ultrasound guidance was used for needle localization. Images were saved and stored for documentation. Short and long axis images of the anterior {LEFT/RIGHT/BILATERAL:47105} knee were taken prior to injection confirming presence of joint effusion. Dynamic visualization of the 18g x 3.5 needle was continuous throughout the procedure.      Patient tolerance: The patient tolerated the " procedure well with no immediate complications. There were no adverse reactions to the medication. Patient was instructed to apply ice to the joint for up to 20 minutes at a time and avoid strenuous activities for 24-36 hours following the injection. The patient was warned of possible blood pressure changes during that time. Following that time, the patient can resume activities as prior to the injection.     The patient was reminded to call the clinic immediately for any adverse side effects as explained in clinic today.     Euflexxa:  Lot: [id number]  Exp: 8/18/25    Assessment:      No diagnosis found.       Plan:     1.first Euflexxa injection of bilateral knee received today (see procedure note above)  2. Follow-up in 1 week for 2nd injection of 3 injection series  3. Patient agreeable to today's plan and all questions were answered

## 2024-08-14 NOTE — PROGRESS NOTES
"Subjective:     Chapincito Burns     Chief Complaint   Patient presents with    Right Knee - Pain    Left Knee - Pain       Chapincito is a 53 y.o. female coming in today for their 1st Euflexxa injection to the bilateral knee.   Objective:     VITAL SIGNS: BP (!) 148/92   Pulse 92   Ht 4' 11" (1.499 m)   BMI 51.81 kg/m²      Euflexxa Injection Procedure #1     A time out was performed, including verification of patient ID, procedure, site and side, availability of information and equipment, review of safety issues, and agreement with consent, the procedure site was marked.    Location: Knee joint, {LEFT/RIGHT/BILATERAL:71056}     Procedure: The patient was prepped aseptically with alcohol and chlorhexidine. Ethyl Chloride spray was used prior to skin puncture to help numb the superficial skin. After cold spray was applied, 2 cc's of 0.2% Naropin was injected into the skin and superficial tissue at the injection site using a 21 G, 2" needle to form an anesthetic tunnel and ensure proper needle placement into the {LEFT/RIGHT/BILATERAL:29969} knee joint space. Using a hemostat, the syringe was exchanged with the Euflexxa syringe, and 2cc of Euflexxa was injected into the {LEFT/RIGHT/BILATERAL:58244} knee joint. The patient was in the supine position during the duration of this procedure and the injection approach was from the superolateral aspect.     Ultrasound guidance was used for needle localization with SonChairishte Edge 2, {Ultrasound Probe:59403} probe(s). Images were saved and stored for documentation. The {LEFT/RIGHT/BILATERAL:06234} knee joint was well visualized.  Dynamic visualization of the needle(s) was continuous throughout the procedure and maintained good position and correct needle placement.        Patient tolerance: The patient tolerated the procedure well with no immediate complications. There were no adverse reactions to the medication. Patient was instructed to apply ice to the joint for up to 20 " "minutes at a time and avoid strenuous activities for 24-36 hours following the injection. The patient was warned of possible blood pressure changes during that time. Following that time, the patient can resume activities as prior to the injection.     The patient was reminded to call the clinic immediately for any adverse side effects as explained in clinic today.    Euflexxa Injection Procedure #1 with aspiration     A time out was performed, including verification of patient ID, procedure, site and side, availability of information and equipment, review of safety issues, and agreement with consent, the procedure site was marked.    Location: Knee joint, {LEFT/RIGHT/BILATERAL:80532}    Procedure: The patient was prepped aseptically with alcohol and chlorhexidine. Ethyl Chloride spray was used prior to skin puncture to help numb the superficial skin. After cold spray was applied, 1 cc's of 1% Lidocaine was injected into the skin and superficial tissue at the injection site using a 25 G, 1.5" needle to form an anesthetic tunnel and ensure proper needle placement into the {LEFT/RIGHT/BILATERAL:45884}  knee joint space. After local anesthetic was applied a 18 G, 3.5 needle was used to enter the {LEFT/RIGHT/BILATERAL:01866} knee joint capsule under US guidance.*** of clear synovial fluid was aspirated. Following aspiration, using a hemostat, the syringe was exchanged with the Euflexxa syringe, and 2cc of Euflexxa was injected into the {LEFT/RIGHT/BILATERAL:99837} knee joint. The patient was in the supine position during the duration of this procedure and the injection approach was from the superolateral aspect.     Ultrasound guidance was used for needle localization. Images were saved and stored for documentation. Short and long axis images of the anterior {LEFT/RIGHT/BILATERAL:27718} knee were taken prior to injection confirming presence of joint effusion. Dynamic visualization of the 18g x 3.5 needle was continuous " throughout the procedure.      Patient tolerance: The patient tolerated the procedure well with no immediate complications. There were no adverse reactions to the medication. Patient was instructed to apply ice to the joint for up to 20 minutes at a time and avoid strenuous activities for 24-36 hours following the injection. The patient was warned of possible blood pressure changes during that time. Following that time, the patient can resume activities as prior to the injection.     The patient was reminded to call the clinic immediately for any adverse side effects as explained in clinic today.     Euflexxa:  Lot: [id number]  Exp: 8/18/25    Assessment:      Encounter Diagnosis   Name Primary?    Primary osteoarthritis of knees, bilateral Yes          Plan:     1.first Euflexxa injection of bilateral knee received today (see procedure note above)  2. Follow-up in 1 week for 2nd injection of 3 injection series  3. Patient agreeable to today's plan and all questions were answered

## 2024-08-21 ENCOUNTER — OFFICE VISIT (OUTPATIENT)
Dept: SPORTS MEDICINE | Facility: CLINIC | Age: 54
End: 2024-08-21
Payer: COMMERCIAL

## 2024-08-21 VITALS — SYSTOLIC BLOOD PRESSURE: 146 MMHG | DIASTOLIC BLOOD PRESSURE: 89 MMHG | HEART RATE: 73 BPM

## 2024-08-21 DIAGNOSIS — M17.0 PRIMARY OSTEOARTHRITIS OF KNEES, BILATERAL: Primary | ICD-10-CM

## 2024-08-21 PROCEDURE — 20611 DRAIN/INJ JOINT/BURSA W/US: CPT | Mod: 50,S$GLB,, | Performed by: NEUROMUSCULOSKELETAL MEDICINE & OMM

## 2024-08-21 PROCEDURE — 99999 PR PBB SHADOW E&M-EST. PATIENT-LVL III: CPT | Mod: PBBFAC,,, | Performed by: NEUROMUSCULOSKELETAL MEDICINE & OMM

## 2024-08-21 PROCEDURE — 99499 UNLISTED E&M SERVICE: CPT | Mod: S$GLB,,, | Performed by: NEUROMUSCULOSKELETAL MEDICINE & OMM

## 2024-08-21 NOTE — PROGRESS NOTES
"Subjective:     Chapincito Burns     Chief Complaint   Patient presents with    Left Knee - Pain    Right Knee - Pain       Chapincito is a 53 y.o. female coming in today for their 2nd Euflexxa injection to the bilateral knees.   Objective:     VITAL SIGNS: BP (!) 146/89   Pulse 73      Euflexxa Injection Procedure #2     A time out was performed, including verification of patient ID, procedure, site and side, availability of information and equipment, review of safety issues, and agreement with consent, the procedure site was marked.    Location: Knee joint, bilateral     Procedure: The patient was prepped aseptically with alcohol and chlorhexidine. Ethyl Chloride spray was used prior to skin puncture to help numb the superficial skin. After cold spray was applied, 2 cc's of 0.2% Naropin was injected into the skin and superficial tissue at the injection site using a 22 G, 2.5" needle to form an anesthetic tunnel and ensure proper needle placement into the bilateral knee joint space. Using a hemostat, the syringe was exchanged with the Euflexxa syringe, and 2cc of Euflexxa was injected into the bilateral knee joint. The patient was in the supine position during the duration of this procedure and the injection approach was from the superolateral aspect.     Ultrasound guidance was used for needle localization with SonoSite Edge 2, 9-L MHz linear probe(s). Images were saved and stored for documentation. The bilateral knee joints were well visualized.  Dynamic visualization of the needle(s) was continuous throughout the procedure and maintained good position and correct needle placement.        Patient tolerance: The patient tolerated the procedure well with no immediate complications. There were no adverse reactions to the medication. Patient was instructed to apply ice to the joint for up to 20 minutes at a time and avoid strenuous activities for 24-36 hours following the injection. The patient was warned of " possible blood pressure changes during that time. Following that time, the patient can resume activities as prior to the injection.     The patient was reminded to call the clinic immediately for any adverse side effects as explained in clinic today.    Euflexxa:  Lot: [id number]   Exp: 8/18/25    Assessment:      Encounter Diagnosis   Name Primary?    Primary osteoarthritis of knees, bilateral Yes        Plan:   1.second Euflexxa injections of bilateral knee received today (see procedure note above)  2. Follow-up in 1 week for 3rd injection of 3 injection series  3. Patient agreeable to today's plan and all questions were answered    This note is dictated using the M*Modal Fluency Direct word recognition program. There are word recognition mistakes that are occasionally missed on review.

## 2024-08-27 ENCOUNTER — TELEPHONE (OUTPATIENT)
Dept: SPORTS MEDICINE | Facility: CLINIC | Age: 54
End: 2024-08-27
Payer: COMMERCIAL

## 2024-08-27 NOTE — TELEPHONE ENCOUNTER
Called patient and rescheduled her injection appointment from 8/27 to 9/4 at 4:20 due to Dr. Mo being out of clinic on 8/27.

## 2024-08-28 DIAGNOSIS — I10 ESSENTIAL HYPERTENSION: ICD-10-CM

## 2024-08-29 NOTE — PROGRESS NOTES
"Subjective:     Chapincito Burns     Chief Complaint   Patient presents with    Injections     Chapincito is a 53 y.o. female coming in today for their 3rd Euflexxa injection to the bilateral knees.   Objective:     VITAL SIGNS: BP (!) 164/88      Euflexxa Injection Procedure #3     A time out was performed, including verification of patient ID, procedure, site and side, availability of information and equipment, review of safety issues, and agreement with consent, the procedure site was marked.    Location: Knee joint, bilateral     Procedure: The patient was prepped aseptically with alcohol and chlorhexidine. Ethyl Chloride spray was used prior to skin puncture to help numb the superficial skin. After cold spray was applied, 2 cc's of 0.2% Naropin was injected into the skin and superficial tissue at the injection site using a 22 G, 2.5" needle to form an anesthetic tunnel and ensure proper needle placement into the bilateral knee joint space. Using a hemostat, the syringe was exchanged with the Euflexxa syringe, and 2cc of Euflexxa was injected into the bilateral knee joint. The patient was in the supine position during the duration of this procedure and the injection approach was from the superolateral aspect.     Ultrasound guidance was used for needle localization with Sonbluebird biote Edge 2, 9-L MHz linear probe(s). Images were saved and stored for documentation. The bilateral knee joints were well visualized.  Dynamic visualization of the needle(s) was continuous throughout the procedure and maintained good position and correct needle placement.        Patient tolerance: The patient tolerated the procedure well with no immediate complications. There were no adverse reactions to the medication. Patient was instructed to apply ice to the joint for up to 20 minutes at a time and avoid strenuous activities for 24-36 hours following the injection. The patient was warned of possible blood pressure changes during that " time. Following that time, the patient can resume activities as prior to the injection.     The patient was reminded to call the clinic immediately for any adverse side effects as explained in clinic today.    Bilateral Euflexxa:  Lot: [id number]  Exp: 9/4/25    Assessment:      Encounter Diagnosis   Name Primary?    Primary osteoarthritis of knees, bilateral Yes        Plan:   1.third Euflexxa injections of bilateral knee received today (see procedure note above)  2. Follow-up in 4 months  3. Patient agreeable to today's plan and all questions were answered    This note is dictated using the M*Modal Fluency Direct word recognition program. There are word recognition mistakes that are occasionally missed on review.

## 2024-09-04 ENCOUNTER — OFFICE VISIT (OUTPATIENT)
Dept: SPORTS MEDICINE | Facility: CLINIC | Age: 54
End: 2024-09-04
Payer: COMMERCIAL

## 2024-09-04 VITALS — DIASTOLIC BLOOD PRESSURE: 88 MMHG | SYSTOLIC BLOOD PRESSURE: 164 MMHG

## 2024-09-04 DIAGNOSIS — M17.0 PRIMARY OSTEOARTHRITIS OF KNEES, BILATERAL: Primary | ICD-10-CM

## 2024-09-04 PROCEDURE — 20611 DRAIN/INJ JOINT/BURSA W/US: CPT | Mod: 50,S$GLB,, | Performed by: NEUROMUSCULOSKELETAL MEDICINE & OMM

## 2024-09-04 PROCEDURE — 99999 PR PBB SHADOW E&M-EST. PATIENT-LVL III: CPT | Mod: PBBFAC,,, | Performed by: NEUROMUSCULOSKELETAL MEDICINE & OMM

## 2024-09-04 PROCEDURE — 99499 UNLISTED E&M SERVICE: CPT | Mod: S$GLB,,, | Performed by: NEUROMUSCULOSKELETAL MEDICINE & OMM

## 2024-10-05 ENCOUNTER — HOSPITAL ENCOUNTER (OUTPATIENT)
Dept: RADIOLOGY | Facility: HOSPITAL | Age: 54
Discharge: HOME | End: 2024-10-05
Attending: PHYSICIAN ASSISTANT
Payer: COMMERCIAL

## 2024-10-05 DIAGNOSIS — Z12.31 SCREENING MAMMOGRAM, ENCOUNTER FOR: ICD-10-CM

## 2024-10-05 PROCEDURE — 77067 SCR MAMMO BI INCL CAD: CPT | Mod: 26,,, | Performed by: RADIOLOGY

## 2024-10-05 PROCEDURE — 77067 SCR MAMMO BI INCL CAD: CPT | Mod: TC

## 2024-10-05 PROCEDURE — 77063 BREAST TOMOSYNTHESIS BI: CPT | Mod: 26,,, | Performed by: RADIOLOGY

## 2024-10-21 ENCOUNTER — OFFICE VISIT (OUTPATIENT)
Dept: BARIATRICS | Facility: CLINIC | Age: 54
End: 2024-10-21
Payer: COMMERCIAL

## 2024-10-21 VITALS
SYSTOLIC BLOOD PRESSURE: 160 MMHG | OXYGEN SATURATION: 97 % | HEIGHT: 59 IN | HEART RATE: 79 BPM | DIASTOLIC BLOOD PRESSURE: 85 MMHG | WEIGHT: 240.88 LBS | BODY MASS INDEX: 48.56 KG/M2

## 2024-10-21 DIAGNOSIS — E66.813 CLASS 3 SEVERE OBESITY DUE TO EXCESS CALORIES WITH SERIOUS COMORBIDITY AND BODY MASS INDEX (BMI) OF 45.0 TO 49.9 IN ADULT: Primary | ICD-10-CM

## 2024-10-21 DIAGNOSIS — E66.01 CLASS 3 SEVERE OBESITY DUE TO EXCESS CALORIES WITH SERIOUS COMORBIDITY AND BODY MASS INDEX (BMI) OF 45.0 TO 49.9 IN ADULT: Primary | ICD-10-CM

## 2024-10-21 PROCEDURE — 99999 PR PBB SHADOW E&M-EST. PATIENT-LVL IV: CPT | Mod: PBBFAC,,, | Performed by: INTERNAL MEDICINE

## 2024-10-21 PROCEDURE — 3008F BODY MASS INDEX DOCD: CPT | Mod: CPTII,S$GLB,, | Performed by: INTERNAL MEDICINE

## 2024-10-21 PROCEDURE — 3077F SYST BP >= 140 MM HG: CPT | Mod: CPTII,S$GLB,, | Performed by: INTERNAL MEDICINE

## 2024-10-21 PROCEDURE — 1159F MED LIST DOCD IN RCRD: CPT | Mod: CPTII,S$GLB,, | Performed by: INTERNAL MEDICINE

## 2024-10-21 PROCEDURE — 3079F DIAST BP 80-89 MM HG: CPT | Mod: CPTII,S$GLB,, | Performed by: INTERNAL MEDICINE

## 2024-10-21 PROCEDURE — 99213 OFFICE O/P EST LOW 20 MIN: CPT | Mod: S$GLB,,, | Performed by: INTERNAL MEDICINE

## 2024-10-21 RX ORDER — TOPIRAMATE 25 MG/1
25 TABLET ORAL 2 TIMES DAILY
Qty: 180 TABLET | Refills: 1 | Status: SHIPPED | OUTPATIENT
Start: 2024-10-21 | End: 2025-10-21

## 2024-10-21 NOTE — PROGRESS NOTES
"Subjective     Patient ID: Chapincito Burns is a 53 y.o. female.    Chief Complaint: Follow-up and Obesity    CC: weight    Pt here today for follow-up. Has lost 22lbs (-6.2 lbs muscle. -13.9 lbs fat). Was to start 1000 dianna pb meal planner, exercise and started topiramate 25 mg BID. Denies SE. Does feel like her appetite is down.   States she has been cutting back on snacks and fried foods. Has not used planner. Has been drinking a protein drink in AM.   Has no longer been going to water therapy and pool. In knee brace currently.      New BMR: 1417    New PBF: 55.6%       Initial:   BMR: 1506    PBF:  56.1%      History of medication for loss:   checked today. Either phentermine or Phen-fen. Contrave- had HAs for 4 days, so stopped it.           Review of Systems       Objective   BP (!) 160/85 (BP Location: Right arm, Patient Position: Sitting)   Pulse 79   Ht 4' 11" (1.499 m)   Wt 109.3 kg (240 lb 14.4 oz)   SpO2 97%   BMI 48.66 kg/m²     Physical Exam  Vitals reviewed.   Constitutional:       General: She is not in acute distress.     Appearance: She is well-developed.   HENT:      Head: Normocephalic and atraumatic.   Eyes:      General: No scleral icterus.     Pupils: Pupils are equal, round, and reactive to light.   Cardiovascular:      Rate and Rhythm: Normal rate.   Pulmonary:      Effort: Pulmonary effort is normal. No respiratory distress.      Breath sounds: Normal breath sounds.   Musculoskeletal:         General: Normal range of motion.      Comments: Wearing knee brace   Skin:     General: Skin is warm and dry.      Findings: No erythema.   Neurological:      Mental Status: She is alert and oriented to person, place, and time.      Cranial Nerves: No cranial nerve deficit.   Psychiatric:         Behavior: Behavior normal.         Judgment: Judgment normal.            Assessment and Plan     1. Class 3 severe obesity due to excess calories with serious comorbidity and body mass index (BMI) of " 45.0 to 49.9 in adult    Other orders  -     topiramate (TOPAMAX) 25 MG tablet; Take 1 tablet (25 mg total) by mouth 2 (two) times daily.  Dispense: 180 tablet; Refill: 1        Chapincito was seen today for follow-up and obesity.    Diagnoses and all orders for this visit:    Class 3 severe obesity due to excess calories with serious comorbidity and body mass index (BMI) of 45.0 to 49.9 in adult    Other orders  -     topiramate (TOPAMAX) 25 MG tablet; Take 1 tablet (25 mg total) by mouth 2 (two) times daily.          Patient was informed that topiramate is used for migraine prevention and seizures. Weight loss is a common side effect that is well documented. S/he understands this. S/he was informed of the potential side effects such as serious and possibly fatal rash in which case the medication should be discontinued immediately. Paresthesias, forgetfulness, fatigue, kidney stones, GI symptoms, and changes in lab values such as electrolytes, blood counts and kidney function.    Start topiramate 25mg  morning and evening. Pt will contact clinic if she needs an increase before next appt.       Increase low impact activity as tolerated.  Avoid high impact activity, very heavy lifting or other exercise regimens that may cause discomfort.     Add some type of resistance training 2-3 days a week. These can be body weight exercises, light weight or elastic bands. gumi and [employer] are great sources for free work out plans and videos.     1000 dianna pb meal planner, meal ideas and exercise handouts given previously    Holiday tips given         No follow-ups on file.

## 2024-10-21 NOTE — PATIENT INSTRUCTIONS
Patient was informed that topiramate is used for migraine prevention and seizures. Weight loss is a common side effect that is well documented. S/he understands this. S/he was informed of the potential side effects such as serious and possibly fatal rash in which case the medication should be discontinued immediately. Paresthesias, forgetfulness, fatigue, kidney stones, GI symptoms, and changes in lab values such as electrolytes, blood counts and kidney function.    Start topiramate 25mg  morning and evening. Pt will contact clinic if she needs an increase before next appt.       Increase low impact activity as tolerated.  Avoid high impact activity, very heavy lifting or other exercise regimens that may cause discomfort.     Add some type of resistance training 2-3 days a week. These can be body weight exercises, light weight or elastic bands. [employer] and [employer] are great sources for free work out plans and videos.     1000 dianna pb meal planner, meal ideas and exercise handouts given previously      Helpful tips to survive the holidays:  Dont save yourself for the meal: Make sure you continue to eat high protein small meals every 3-4 hours to ensure to do not become over-hungry. Avoid temptation by not showing up to a holiday party or gathering hungry.   Plan ahead. Bring a dish to a party if you know there may not be an appropriate option.   Choose sugar-free drinks: Stick to water or other sugar-free beverages and make sure you are getting 6-8 cups of fluid each day (but not with meals!). Avoid alcohol, carbonated beverages, and high-fat/high-sugar beverages like hot chocolate and eggnog. Try sugar-free hot cocoa made with skim milk or water, or sugar-free spiced tea to add some holiday flair to your beverage (see sugar-free mulled cider recipe below)  Take your time: Eat mindfully. Dont graze on food throughout the day. Sit down to enjoy your small meals. Chew slowly and thoroughly. Cut your food into  small pieces before eating.  Listen to your body: Stop eating as soon as you feel full. Do not feel pressured to try certain (or all) foods or to eat all of the food on your plate. Listen to your hunger cues.   REMEMBER: Make your holidays about spending time with family and friends instead of focusing gatherings around food.  Keep up your exercise routine: Make sure you continue to get regular exercise throughout the holiday season. Encourage friends and family to be active by taking a walk together after a meal, to look at holiday lights, or to window-shop.    Good Holiday Meal Options:  Roasted Turkey, NO skin. Use low sodium broth instead of gravy.   Stuffed Bell Peppers made WITHOUT bread crumbs or Rice. Try using parmesan cheese instead  Gumbo, NO rice. Try picking out mostly the meat/seafood and vegetables with little broth.   Green Bean Casserole made with 98% fat free cream of mushroom soup and crushed almonds/pecans instead of fried onions  Side salad w/ low fat dressing. Try a different kind of salad maybe use Kale or spinach.   Roasted non-starchy vegetables like brussel sprouts, broccoli, green beans, zucchini, butternut squash, cauliflower  Cauliflower Mash (steam or roast cauliflower, puree w/ low fat cheese, dash of fat free milk and 2-3 sprays of I cant believe its not butter spray. Add garlic powder and black pepper to season). Use Low sodium broth instead of gravy.   Try Loaded Cauliflower Mash (Make cauliflower like above cauliflower mash. Top with diced turkey best, ¼ cup low fat cheddar cheese and bake @ 350* F for 5-10 minutes, until cheese is melted. Top with minced chives, black pepper and garlic to taste).   Homemade cranberry sauce using Splenda or another alternative sweetener. Boil fresh cranberries and add splenda to taste. Boil until cranberries break open and then simmer until it reaches the consistency you want (less time for more watery sauce and simmer for longer to create a  thicker sauce).   Deviled eggs: make using low fat gill, mustard, DILL relish (not sweet relish).   Vegetable tray w/ Greek yogurt Ranch Dip. Mix 1 packet of [place] ranch dip mix w/ 16 oz low fat plain greek yogurt.     Good Holiday Dessert Options:  High protein Pumpkin Cheesecake (see recipe below)  Pumpkin Whip (see recipe below)  Quest Apple Pie or Cinnamon Roll flavored protein bar (warm in microwave for 10-15 seconds)  Eggnog Protein shake (see recipe below)  Fresh fruit w/ low fat cheese  Sugar-free Jello Parfaits. Layer Red and Green sugar-free jello in cups and top w/ 2 tbsp Sugar-free cool-whip    Pumpkin Cheesecake    8 ounces fat free cream cheese, softened   2 scoops of vanilla protein powder (<4 g sugar per serving)   ¼ tsp Fine salt   2 eggs, at room temperature   1/3 cup fat free sour cream  1/3 cup fat free half and half  1 15 -ounce can pure pumpkin puree   1 tablespoon pumpkin pie spice, plus more for dusting   Unsalted nuts, crushed  *Add splenda to taste    Directions     1. Preheat the oven to 300 degrees F. Line 18 muffin cups with paper liners. Sprinkle 1 tsp crushed unsalted nuts at the bottom of each of muffin cup liner.     2. In a large bowl, beat the cream cheese, vanilla protein powder and 1/4 teaspoon fine salt on medium-high speed until smooth and creamy, 2 to 3 minutes. Scrape down the sides, reduce speed to low and beat in the eggs, 1 at a time, until combined. Beat in 1/3 cup fat free sour cream and fat free half and half. Stir in the pumpkin puree and pumpkin pie spice until smooth. Divide evenly among cookie-lined paper cups, filling almost all the way to the top.     3. Bake until the filling is just set, 40 to 45 minutes. A sharp knife inserted into the center will come out moist, but clean. Cool completely in tins on a wire rack. Refrigerate until cold, 4 hours, or overnight. Top with a dusting of pumpkin pie spice.    Recipe altered from the following recipe:  http://www.foodMediafly.com/recipes/food-network-tracy/mini-pumpkin-cheesecakes-recipe.print.html?oc=linkback    Pumpkin Whip    Box of sugar-free vanilla pudding  Can of pumpkin puree  Pumpkin Pie spice (sprinkle to taste)  ½ cup of sugar-free Cool Whip    Directions:  Make sugar-free pudding according to package directions using fat free or 1% milk. Stir in pumpkin and cool whip. Add pumpkin pie spice to taste.     Egg Nog Protein shake    8 oz skim or 1% milk  1 scoop vanilla protein powder  1 tbsp sugar-free vanilla pudding mix  ½ tsp butter flavor extract  ½ tsp rum extract  ½ tsp cinnamon     Shake together or blend with ice and serve.     Sugar-Free Mulled Cider    3 oz diet cran-apple juice  6 oz water  1 packet sugar-free apple cider mix  ½ tsp apple pie spice  ½ tsp butter flavor extract  1 tbsp Sugar-free Syrup    Mix together. Warm if needed and serve w/ orange wedge and cinnamon stick.

## 2024-10-30 ENCOUNTER — OFFICE VISIT (OUTPATIENT)
Dept: INTERNAL MEDICINE | Facility: CLINIC | Age: 54
End: 2024-10-30
Payer: COMMERCIAL

## 2024-10-30 VITALS
HEART RATE: 96 BPM | OXYGEN SATURATION: 97 % | RESPIRATION RATE: 18 BRPM | HEIGHT: 60 IN | BODY MASS INDEX: 48.06 KG/M2 | TEMPERATURE: 98 F | DIASTOLIC BLOOD PRESSURE: 78 MMHG | SYSTOLIC BLOOD PRESSURE: 130 MMHG | WEIGHT: 244.81 LBS

## 2024-10-30 DIAGNOSIS — I10 HYPERTENSION, ESSENTIAL: ICD-10-CM

## 2024-10-30 DIAGNOSIS — Z00.00 HEALTH CARE MAINTENANCE: Primary | ICD-10-CM

## 2024-10-30 DIAGNOSIS — E66.01 SEVERE OBESITY (BMI >= 40): ICD-10-CM

## 2024-10-30 PROCEDURE — 99999 PR PBB SHADOW E&M-EST. PATIENT-LVL IV: CPT | Mod: PBBFAC,,, | Performed by: INTERNAL MEDICINE

## 2024-10-30 PROCEDURE — 3078F DIAST BP <80 MM HG: CPT | Mod: CPTII,S$GLB,, | Performed by: INTERNAL MEDICINE

## 2024-10-30 PROCEDURE — 99396 PREV VISIT EST AGE 40-64: CPT | Mod: S$GLB,,, | Performed by: INTERNAL MEDICINE

## 2024-10-30 PROCEDURE — 3075F SYST BP GE 130 - 139MM HG: CPT | Mod: CPTII,S$GLB,, | Performed by: INTERNAL MEDICINE

## 2024-10-30 PROCEDURE — 3008F BODY MASS INDEX DOCD: CPT | Mod: CPTII,S$GLB,, | Performed by: INTERNAL MEDICINE

## 2024-10-30 PROCEDURE — 1159F MED LIST DOCD IN RCRD: CPT | Mod: CPTII,S$GLB,, | Performed by: INTERNAL MEDICINE

## 2024-10-30 PROCEDURE — 1160F RVW MEDS BY RX/DR IN RCRD: CPT | Mod: CPTII,S$GLB,, | Performed by: INTERNAL MEDICINE

## 2024-11-05 ENCOUNTER — IMMUNIZATION (OUTPATIENT)
Dept: INTERNAL MEDICINE | Facility: CLINIC | Age: 54
End: 2024-11-05
Payer: COMMERCIAL

## 2024-11-05 ENCOUNTER — LAB VISIT (OUTPATIENT)
Dept: LAB | Facility: HOSPITAL | Age: 54
End: 2024-11-05
Attending: INTERNAL MEDICINE
Payer: COMMERCIAL

## 2024-11-05 DIAGNOSIS — Z00.00 HEALTH CARE MAINTENANCE: ICD-10-CM

## 2024-11-05 DIAGNOSIS — Z23 NEED FOR VACCINATION: Primary | ICD-10-CM

## 2024-11-05 LAB
25(OH)D3+25(OH)D2 SERPL-MCNC: 43 NG/ML (ref 30–96)
ALBUMIN SERPL BCP-MCNC: 3.5 G/DL (ref 3.5–5.2)
ALP SERPL-CCNC: 90 U/L (ref 40–150)
ALT SERPL W/O P-5'-P-CCNC: 10 U/L (ref 10–44)
ANION GAP SERPL CALC-SCNC: 9 MMOL/L (ref 8–16)
AST SERPL-CCNC: 17 U/L (ref 10–40)
BASOPHILS # BLD AUTO: 0.04 K/UL (ref 0–0.2)
BASOPHILS NFR BLD: 0.4 % (ref 0–1.9)
BILIRUB SERPL-MCNC: 0.4 MG/DL (ref 0.1–1)
BUN SERPL-MCNC: 17 MG/DL (ref 6–20)
CALCIUM SERPL-MCNC: 9.7 MG/DL (ref 8.7–10.5)
CHLORIDE SERPL-SCNC: 110 MMOL/L (ref 95–110)
CHOLEST SERPL-MCNC: 150 MG/DL (ref 120–199)
CHOLEST/HDLC SERPL: 3.1 {RATIO} (ref 2–5)
CO2 SERPL-SCNC: 20 MMOL/L (ref 23–29)
CREAT SERPL-MCNC: 0.9 MG/DL (ref 0.5–1.4)
DIFFERENTIAL METHOD BLD: ABNORMAL
EOSINOPHIL # BLD AUTO: 0.2 K/UL (ref 0–0.5)
EOSINOPHIL NFR BLD: 2.3 % (ref 0–8)
ERYTHROCYTE [DISTWIDTH] IN BLOOD BY AUTOMATED COUNT: 16.4 % (ref 11.5–14.5)
EST. GFR  (NO RACE VARIABLE): >60 ML/MIN/1.73 M^2
ESTIMATED AVG GLUCOSE: 108 MG/DL (ref 68–131)
GLUCOSE SERPL-MCNC: 89 MG/DL (ref 70–110)
HBA1C MFR BLD: 5.4 % (ref 4–5.6)
HCT VFR BLD AUTO: 45.5 % (ref 37–48.5)
HDLC SERPL-MCNC: 49 MG/DL (ref 40–75)
HDLC SERPL: 32.7 % (ref 20–50)
HGB BLD-MCNC: 13.9 G/DL (ref 12–16)
IMM GRANULOCYTES # BLD AUTO: 0.04 K/UL (ref 0–0.04)
IMM GRANULOCYTES NFR BLD AUTO: 0.4 % (ref 0–0.5)
LDLC SERPL CALC-MCNC: 87.8 MG/DL (ref 63–159)
LYMPHOCYTES # BLD AUTO: 2.6 K/UL (ref 1–4.8)
LYMPHOCYTES NFR BLD: 26.4 % (ref 18–48)
MCH RBC QN AUTO: 25.4 PG (ref 27–31)
MCHC RBC AUTO-ENTMCNC: 30.5 G/DL (ref 32–36)
MCV RBC AUTO: 83 FL (ref 82–98)
MONOCYTES # BLD AUTO: 0.5 K/UL (ref 0.3–1)
MONOCYTES NFR BLD: 5.1 % (ref 4–15)
NEUTROPHILS # BLD AUTO: 6.4 K/UL (ref 1.8–7.7)
NEUTROPHILS NFR BLD: 65.4 % (ref 38–73)
NONHDLC SERPL-MCNC: 101 MG/DL
NRBC BLD-RTO: 0 /100 WBC
PLATELET # BLD AUTO: 382 K/UL (ref 150–450)
PMV BLD AUTO: 10.1 FL (ref 9.2–12.9)
POTASSIUM SERPL-SCNC: 3.9 MMOL/L (ref 3.5–5.1)
PROT SERPL-MCNC: 7.9 G/DL (ref 6–8.4)
RBC # BLD AUTO: 5.47 M/UL (ref 4–5.4)
SODIUM SERPL-SCNC: 139 MMOL/L (ref 136–145)
T4 FREE SERPL-MCNC: 1.15 NG/DL (ref 0.71–1.51)
TRIGL SERPL-MCNC: 66 MG/DL (ref 30–150)
TSH SERPL DL<=0.005 MIU/L-ACNC: 0.28 UIU/ML (ref 0.4–4)
WBC # BLD AUTO: 9.81 K/UL (ref 3.9–12.7)

## 2024-11-05 PROCEDURE — 80061 LIPID PANEL: CPT | Performed by: INTERNAL MEDICINE

## 2024-11-05 PROCEDURE — 83036 HEMOGLOBIN GLYCOSYLATED A1C: CPT | Performed by: INTERNAL MEDICINE

## 2024-11-05 PROCEDURE — 84443 ASSAY THYROID STIM HORMONE: CPT | Performed by: INTERNAL MEDICINE

## 2024-11-05 PROCEDURE — 85025 COMPLETE CBC W/AUTO DIFF WBC: CPT | Performed by: INTERNAL MEDICINE

## 2024-11-05 PROCEDURE — 90480 ADMN SARSCOV2 VAC 1/ONLY CMP: CPT | Mod: S$GLB,,, | Performed by: INTERNAL MEDICINE

## 2024-11-05 PROCEDURE — 84439 ASSAY OF FREE THYROXINE: CPT | Performed by: INTERNAL MEDICINE

## 2024-11-05 PROCEDURE — 36415 COLL VENOUS BLD VENIPUNCTURE: CPT | Performed by: INTERNAL MEDICINE

## 2024-11-05 PROCEDURE — 82306 VITAMIN D 25 HYDROXY: CPT | Performed by: INTERNAL MEDICINE

## 2024-11-05 PROCEDURE — 80053 COMPREHEN METABOLIC PANEL: CPT | Performed by: INTERNAL MEDICINE

## 2024-11-05 PROCEDURE — 91320 SARSCV2 VAC 30MCG TRS-SUC IM: CPT | Mod: S$GLB,,, | Performed by: INTERNAL MEDICINE

## 2024-12-03 DIAGNOSIS — E05.90 SUBCLINICAL HYPERTHYROIDISM: Primary | ICD-10-CM

## 2025-01-08 ENCOUNTER — OFFICE VISIT (OUTPATIENT)
Dept: SPORTS MEDICINE | Facility: CLINIC | Age: 55
End: 2025-01-08
Payer: COMMERCIAL

## 2025-01-08 VITALS — DIASTOLIC BLOOD PRESSURE: 95 MMHG | SYSTOLIC BLOOD PRESSURE: 162 MMHG

## 2025-01-08 DIAGNOSIS — M17.0 PRIMARY OSTEOARTHRITIS OF KNEES, BILATERAL: Primary | ICD-10-CM

## 2025-01-08 DIAGNOSIS — M25.662 STIFFNESS OF LEFT KNEE: ICD-10-CM

## 2025-01-08 PROCEDURE — 1159F MED LIST DOCD IN RCRD: CPT | Mod: CPTII,S$GLB,, | Performed by: NEUROMUSCULOSKELETAL MEDICINE & OMM

## 2025-01-08 PROCEDURE — 3080F DIAST BP >= 90 MM HG: CPT | Mod: CPTII,S$GLB,, | Performed by: NEUROMUSCULOSKELETAL MEDICINE & OMM

## 2025-01-08 PROCEDURE — 1160F RVW MEDS BY RX/DR IN RCRD: CPT | Mod: CPTII,S$GLB,, | Performed by: NEUROMUSCULOSKELETAL MEDICINE & OMM

## 2025-01-08 PROCEDURE — 99214 OFFICE O/P EST MOD 30 MIN: CPT | Mod: S$GLB,,, | Performed by: NEUROMUSCULOSKELETAL MEDICINE & OMM

## 2025-01-08 PROCEDURE — 99999 PR PBB SHADOW E&M-EST. PATIENT-LVL III: CPT | Mod: PBBFAC,,, | Performed by: NEUROMUSCULOSKELETAL MEDICINE & OMM

## 2025-01-08 PROCEDURE — 3077F SYST BP >= 140 MM HG: CPT | Mod: CPTII,S$GLB,, | Performed by: NEUROMUSCULOSKELETAL MEDICINE & OMM

## 2025-01-08 NOTE — PROGRESS NOTES
Subjective:     Chapincito Burns     Chief Complaint   Patient presents with    Left Knee - Pain    Right Knee - Pain     HPI    Chapincito is a 54 y.o. female coming in today for bilateral knee pain, s/p B Euflexxa series on 9/4/24. Since last visit the pain has Improved. Pt reports her knee pain is overall improved, but the left knee is still more bothersome. The pain is better with rest, VSI and worse with activity, standing. Pt. describes the pain as a 1/10 right, 5/10 left achy pain that does not radiate. There has not been any new a fall/injury/ or traumas since last visit.  Pt. denies any new musculoskeletal complaints at this time.  Patient is still working on weight loss in order to be a candidate for a TKA.    Office note from 9/4/24 reviewed    Joint instability? yes  Mechanical locking/clicking? yes  Affecting ADL's? yes  Affecting sleep? 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    Procedures reviewed:   5/14/24 CSI B knee- good relief x 2-3 weeks  9/4/24 Euflexxa B knee- good relief, ongoing    PAST MEDICAL HISTORY:   Past Medical History:   Diagnosis Date    AR (allergic rhinitis)     Asthma     with bad sinus infection and when I had covid    History of endometriosis     Hypertension     Overweight(278.02)     Sinusitis     Vitamin D deficiency      PAST SURGICAL HISTORY:   Past Surgical History:   Procedure Laterality Date    ADENOIDECTOMY      COLONOSCOPY N/A 4/25/2024    Procedure: COLONOSCOPY;  Surgeon: Murphy Cruz MD;  Location: [facility] ([address] [place] [address]);  Service: Colon and Rectal;  Laterality: N/A;  BMI: 53  Referral:  Jessica Leyva PA-C  PEG  Inst portal  LW  4/3-precall complete-MS    NOSE SURGERY      nasal passage from hole d/t tooth removal at dentist    PELVIC LAPAROSCOPY  2002    CUROLE    TONSILLECTOMY      TUBE THORACOTOMY       FAMILY HISTORY:   Family History   Problem Relation Name Age of Onset    Breast cancer Mother Lula 68        ER/NV+ DCIS  s/p mastectomy. No genetic testing.    Asthma Father Luis Manuel     Lung disease Father Luis Manuel         s/p lobectomy for cancer vs precancer; former smoker    Breast cancer Maternal Grandmother  60    Diabetes Paternal Grandmother      Prostate cancer Paternal Grandfather      Breast cancer Maternal Aunt Isidra 49        contralateral at 60, no genetic testing    Breast cancer Maternal Aunt Nataly (-) 73        Negative genetic testing    Prostate cancer Maternal Uncle David     Vaginal cancer Paternal Aunt Dacia     Liver cancer Paternal Aunt Siobhan     Other Paternal Aunt Isidra         brain?    Colon cancer Neg Hx      Ovarian cancer Neg Hx      Stroke Neg Hx      Hypertension Neg Hx       SOCIAL HISTORY:   Social History     Socioeconomic History    Marital status: Single   Occupational History     Comment: teacher   Tobacco Use    Smoking status: Never     Passive exposure: Never    Smokeless tobacco: Never   Substance and Sexual Activity    Alcohol use: No    Drug use: No    Sexual activity: Yes     Partners: Male     Birth control/protection: Injection     Social Drivers of Health     Financial Resource Strain: Low Risk  (1/2/2024)    Overall Financial Resource Strain (CARDIA)     Difficulty of Paying Living Expenses: Not very hard   Food Insecurity: No Food Insecurity (1/2/2024)    Hunger Vital Sign     Worried About Running Out of Food in the Last Year: Never true     Ran Out of Food in the Last Year: Never true   Transportation Needs: No Transportation Needs (1/2/2024)    PRAPARE - Transportation     Lack of Transportation (Medical): No     Lack of Transportation (Non-Medical): No   Physical Activity: Insufficiently Active (1/2/2024)    Exercise Vital Sign     Days of Exercise per Week: 3 days     Minutes of Exercise per Session: 10 min   Stress: No Stress Concern Present (1/2/2024)    [employer] [place] [employer] - Occupational Stress Questionnaire     Feeling of Stress : Only a little    Housing Stability: Low Risk  (1/2/2024)    Housing Stability Vital Sign     Unable to Pay for Housing in the Last Year: No     Number of Places Lived in the Last Year: 1     Unstable Housing in the Last Year: No     MEDICATIONS:     Current Outpatient Medications:     amLODIPine (NORVASC) 10 MG tablet, Take 1 tablet (10 mg total) by mouth once daily., Disp: 90 tablet, Rfl: 3    cetirizine (ZYRTEC) 10 MG tablet, Take 10 mg by mouth once daily., Disp: , Rfl:     topiramate (TOPAMAX) 25 MG tablet, Take 1 tablet (25 mg total) by mouth 2 (two) times daily., Disp: 180 tablet, Rfl: 1    vitamin D (VITAMIN D3) 1000 units Tab, Take 1,000 Units by mouth once daily., Disp: , Rfl:     ALLERGIES:   Review of patient's allergies indicates:  No Known Allergies    Objective:   VITAL SIGNS: BP (!) 162/95 (Patient Position: Sitting)   Pulse (P) 78    General    Vitals reviewed.  Constitutional: She is oriented to person, place, and time. She appears well-developed and well-nourished.   Neurological: She is alert and oriented to person, place, and time.   Psychiatric: She has a normal mood and affect. Her behavior is normal.           MUSCULOSKELETAL EXAM  BILATERAL KNEE EXAMINATION   Affected side is compared to contralateral knee     Observation:  No edema, erythema, ecchymosis, or effusion noted.  No muscle atrophy of the thighs and calves noted.  No obvious bony deformities noted.   No Genu valgus/varum noted.  No recurvatum noted.    No tibial internal/external torsion.    Posture:  Upright and Posterior pelvis tilt with loss of lumbar lordosis  Gait: Left antalgic with Neutral ankle mechanics and Neutral medial arch    Tenderness:  Patella - none    Lateral joint line - none  Quad tendon - none   Medial joint line - none  Patellar tendon - none  Medial plica - none  Tibial tubercle - none   Lateral plica - none  Pes anserine - none   MCL prox - none  Distal ITB - none   MCL distal - none  MFC - none    LCL prox - none  LFC -  none    LCL distal - none  Tibia - none    Fibula - none    No obvious bursae, plicae, popliteal cysts, or tendon derangement palpated.          ROM (* = with pain):   Active extension to 0° on left without hyperextension, lag, crepitus, or patellar J sign.   Active extension to 0° on right without hyperextension, lag, crepitus, or patellar J sign.  Active flexion to 100° on left* and 120° on right    Strength(* = with pain):  Knee Flexion - 5/5 on left and 5/5 on right  Knee Extension - 5/5 on left and 5/5 on right  Hip Flexion - 5/5 on left and 5/5 on right  Hip Extension - 5/5 on left and 5/5 on right  Ankle dorsiflexion - 5/5 on left and 5/5 on right  Ankle Plantarflexion - 5/5 on left and 5/5 on right    Patellofemoral Exam:   Patellar ballottement - negative  Bulge sign - negative  Patellar grind - negative    No patellar laxity with medial and lateral translation   No apprehension with medial and lateral patellar translation.     Meniscus Testing:     + pain with terminal  flexion at joint lines on left    Ligament Testing:  Lachman's test - negative  No laxity with anterior drawer.  No laxity with posterior drawer.    No laxity with varus testing at 0 and 30 degrees.  No laxity with valgus testing at 0 and 30 degrees.    IT band testing:  Noble Compression test - negative    Neurovascular Examination:   Sensation intact to light touch in the obturator, lateral/intermediate/medial/posterior femoral cutaneous, saphenous, and common peroneal nerves bilaterally.  Motor Function:    Fully intact motor function at hip, knee, foot and ankle.  Negative seated straight leg raise bilaterally.    Pulses intact at the DP and PT arteries bilaterally.    Capillary refill intact <2 seconds in all toes bilaterally.    Assessment:      Encounter Diagnoses   Name Primary?    Primary osteoarthritis of knees, bilateral Yes    Stiffness of left knee         Plan:   1. Bilateral knee pain secondary to severe DJD changes as noted  on x-ray- improved with recent Euflexxa injection series completed on 9/4/24  - Discussed conservative therapy of OA with continued injection therapy ( reapet corticosteroid, viscosupplementation, vs. PRP), Ice up to 20 minutes at a time, and NSAIDs for breakthrough pain. Pt. Would proceed with repeat bilateral euflexxa injections on or after 03/05/2025.  Patient considering a left dexamethasone CSI versus left knee ACP or PRP injection prior to scheduled Euflexxa injection as well.  - EDUCATION FOR PRP:    Education provided on the benefits, adverse effects, likely course of treatment and improvement, and post-injection expectations from PRP.  It generally takes 1-3 treatments to have long standing resolution. We reviewed that initially after treatment, pain may become worse and this is an expected response. We instructed that improvement may be experienced within the first two weeks and that most of the patient's improvement will come between weeks 6 and 12. If there is no improvement, we would not repeat. If less than 90% improvement is experienced at 12 weeks, we would likely repeat.Chapincito was given a PRP information handout.     After face to face conversation, Chapincito expressed understanding that $400 vs. $800 is due at time of check in for each ACP vs. PRP procedure. Patient is aware that this procedure is not covered by insurance companies and does not qualify for financial assistance. Payment can be made via cash, check, debit or credit card.   - continue weight loss efforts to obtain BMI goals for TKA surgery with Dr. Singh   -  X-ray images of bilateral knee taken 4/26/24 (AP bilateral standing, PA bilateral standing in flexion, bilateral merchants, and bilateral lateral views) showed Kellgren-Priyank grade 4 OA bilaterally. Images were personally reviewed with patient.    2.  Follow-up in 2 months for bilateral ultrasound-guided flexor injection series, as discussed above.  Follow-up sooner if patient  decides to proceed with a repeat left knee CSI versus ACP/PRP.     3. Patient agreeable to today's plan and all questions were answered    This note is dictated using the M*Modal Fluency Direct word recognition program. There are word recognition mistakes that are occasionally missed on review.

## 2025-03-12 ENCOUNTER — OFFICE VISIT (OUTPATIENT)
Dept: SPORTS MEDICINE | Facility: CLINIC | Age: 55
End: 2025-03-12
Payer: COMMERCIAL

## 2025-03-12 VITALS — HEART RATE: 83 BPM | SYSTOLIC BLOOD PRESSURE: 155 MMHG | DIASTOLIC BLOOD PRESSURE: 91 MMHG

## 2025-03-12 DIAGNOSIS — M17.0 PRIMARY OSTEOARTHRITIS OF KNEES, BILATERAL: Primary | ICD-10-CM

## 2025-03-12 PROCEDURE — 20611 DRAIN/INJ JOINT/BURSA W/US: CPT | Mod: 50,S$GLB,, | Performed by: NEUROMUSCULOSKELETAL MEDICINE & OMM

## 2025-03-12 PROCEDURE — 99499 UNLISTED E&M SERVICE: CPT | Mod: S$GLB,,, | Performed by: NEUROMUSCULOSKELETAL MEDICINE & OMM

## 2025-03-12 PROCEDURE — 99999 PR PBB SHADOW E&M-EST. PATIENT-LVL III: CPT | Mod: PBBFAC,,, | Performed by: NEUROMUSCULOSKELETAL MEDICINE & OMM

## 2025-03-12 NOTE — PROGRESS NOTES
"Subjective:     Chapincito Burns     Chief Complaint   Patient presents with    Left Knee - Pain    Right Knee - Pain     Chapincito is a 54 y.o. female coming in today for their 1st Euflexxa injection to the bilateral knees.   Objective:     VITAL SIGNS: BP (!) 155/91 (Patient Position: Sitting)   Pulse 83      Euflexxa Injection Procedure #1     A time out was performed, including verification of patient ID, procedure, site and side, availability of information and equipment, review of safety issues, and agreement with consent, the procedure site was marked.    Location: Knee joint, bilateral     Procedure: The patient was prepped aseptically with alcohol and chlorhexidine. Ethyl Chloride spray was used prior to skin puncture to help numb the superficial skin. After cold spray was applied, 2 cc's of 0.2% Naropin was injected into the skin and superficial tissue at the injection site using a 22 G, 2.5" needle to form an anesthetic tunnel and ensure proper needle placement into the bilateral knee joint space. Using a hemostat, the syringe was exchanged with the Euflexxa syringe, and 2cc of Euflexxa was injected into the bilateral knee joint. The patient was in the supine position during the duration of this procedure and the injection approach was from the superolateral aspect.     Ultrasound guidance was used for needle localization with Son[employer] Edge 2, 9-L MHz linear probe(s). Images were saved and stored for documentation. The bilateral knee joints were well visualized.  Dynamic visualization of the needle(s) was continuous throughout the procedure and maintained good position and correct needle placement.        Patient tolerance: The patient tolerated the procedure well with no immediate complications. There were no adverse reactions to the medication. Patient was instructed to apply ice to the joint for up to 20 minutes at a time and avoid strenuous activities for 24-36 hours following the injection. The " patient was warned of possible blood pressure changes during that time. Following that time, the patient can resume activities as prior to the injection.     The patient was reminded to call the clinic immediately for any adverse side effects as explained in clinic today.     Euflexxa:  Lot: [id number]  Exp: 11/11/2025    Assessment:      Encounter Diagnosis   Name Primary?    Primary osteoarthritis of knees, bilateral Yes          Plan:   1.first Euflexxa injection of bilateral knee received today (see procedure note above)  2. Follow-up in 1 week for 2nd injection of 3 injection series  3. Patient agreeable to today's plan and all questions were answered    This note is dictated using the M*Modal Fluency Direct word recognition program. There are word recognition mistakes that are occasionally missed on review.

## 2025-03-19 ENCOUNTER — OFFICE VISIT (OUTPATIENT)
Dept: SPORTS MEDICINE | Facility: CLINIC | Age: 55
End: 2025-03-19
Payer: COMMERCIAL

## 2025-03-19 VITALS — SYSTOLIC BLOOD PRESSURE: 148 MMHG | DIASTOLIC BLOOD PRESSURE: 86 MMHG

## 2025-03-19 DIAGNOSIS — M17.0 PRIMARY OSTEOARTHRITIS OF KNEES, BILATERAL: Primary | ICD-10-CM

## 2025-03-19 PROCEDURE — 99999 PR PBB SHADOW E&M-EST. PATIENT-LVL III: CPT | Mod: PBBFAC,,, | Performed by: NEUROMUSCULOSKELETAL MEDICINE & OMM

## 2025-03-19 PROCEDURE — 99499 UNLISTED E&M SERVICE: CPT | Mod: S$GLB,,, | Performed by: NEUROMUSCULOSKELETAL MEDICINE & OMM

## 2025-03-19 PROCEDURE — 20611 DRAIN/INJ JOINT/BURSA W/US: CPT | Mod: 50,S$GLB,, | Performed by: NEUROMUSCULOSKELETAL MEDICINE & OMM

## 2025-03-19 NOTE — PROGRESS NOTES
"Subjective:     Chapincito Burns     Chief Complaint   Patient presents with    Left Knee - Pain    Right Knee - Pain     Chapincito is a 54 y.o. female coming in today for their 2nd Euflexxa injection to the bilateral knees.   Objective:     VITAL SIGNS: BP (!) 148/86 (Patient Position: Sitting)   Pulse (P) 78      Euflexxa Injection Procedure #2     A time out was performed, including verification of patient ID, procedure, site and side, availability of information and equipment, review of safety issues, and agreement with consent, the procedure site was marked.    Location: Knee joint, bilateral     Procedure: The patient was prepped aseptically with alcohol and chlorhexidine. Ethyl Chloride spray was used prior to skin puncture to help numb the superficial skin. After cold spray was applied, 2 cc's of 0.2% Naropin was injected into the skin and superficial tissue at the injection site using a 22 G, 2.5" needle to form an anesthetic tunnel and ensure proper needle placement into the bilateral knee joint space. Using a hemostat, the syringe was exchanged with the Euflexxa syringe, and 2cc of Euflexxa was injected into the bilateral knee joint. The patient was in the supine position during the duration of this procedure and the injection approach was from the superolateral aspect.     Ultrasound guidance was used for needle localization with [employer] Edge 2, 9-L MHz linear probe(s). Images were saved and stored for documentation. The bilateral knee joints were well visualized.  Dynamic visualization of the needle(s) was continuous throughout the procedure and maintained good position and correct needle placement.        Patient tolerance: The patient tolerated the procedure well with no immediate complications. There were no adverse reactions to the medication. Patient was instructed to apply ice to the joint for up to 20 minutes at a time and avoid strenuous activities for 24-36 hours following the injection. The " patient was warned of possible blood pressure changes during that time. Following that time, the patient can resume activities as prior to the injection.     The patient was reminded to call the clinic immediately for any adverse side effects as explained in clinic today.     Euflexxa:  Lot: [id number]  Exp: 12/09/2025    Assessment:      Encounter Diagnosis   Name Primary?    Primary osteoarthritis of knees, bilateral Yes        Plan:   1.second Euflexxa injection of bilateral knee received today (see procedure note above)  2. Follow-up in 1 week for 3rd injection of 3 injection series  3. Patient agreeable to today's plan and all questions were answered    This note is dictated using the M*Modal Fluency Direct word recognition program. There are word recognition mistakes that are occasionally missed on review.

## 2025-03-26 ENCOUNTER — OFFICE VISIT (OUTPATIENT)
Dept: SPORTS MEDICINE | Facility: CLINIC | Age: 55
End: 2025-03-26
Payer: COMMERCIAL

## 2025-03-26 VITALS — SYSTOLIC BLOOD PRESSURE: 175 MMHG | HEART RATE: 92 BPM | DIASTOLIC BLOOD PRESSURE: 92 MMHG

## 2025-03-26 DIAGNOSIS — M17.0 PRIMARY OSTEOARTHRITIS OF KNEES, BILATERAL: Primary | ICD-10-CM

## 2025-03-26 PROCEDURE — 99999 PR PBB SHADOW E&M-EST. PATIENT-LVL III: CPT | Mod: PBBFAC,,, | Performed by: NEUROMUSCULOSKELETAL MEDICINE & OMM

## 2025-03-26 PROCEDURE — 99499 UNLISTED E&M SERVICE: CPT | Mod: S$GLB,,, | Performed by: NEUROMUSCULOSKELETAL MEDICINE & OMM

## 2025-03-26 PROCEDURE — 20611 DRAIN/INJ JOINT/BURSA W/US: CPT | Mod: 50,S$GLB,, | Performed by: NEUROMUSCULOSKELETAL MEDICINE & OMM

## 2025-03-26 NOTE — PROGRESS NOTES
"Subjective:     Chapincito Burns     Chief Complaint   Patient presents with    Left Knee - Pain    Right Knee - Pain     Chapincito is a 54 y.o. female coming in today for their 3rd Euflexxa injection to the bilateral knees.   Objective:     VITAL SIGNS: BP (!) 175/92 (Patient Position: Sitting)   Pulse 92      Euflexxa Injection Procedure #3     A time out was performed, including verification of patient ID, procedure, site and side, availability of information and equipment, review of safety issues, and agreement with consent, the procedure site was marked.    Location: Knee joint, bilateral     Procedure: The patient was prepped aseptically with alcohol and chlorhexidine. Ethyl Chloride spray was used prior to skin puncture to help numb the superficial skin. After cold spray was applied, 2 cc's of 0.2% Naropin was injected into the skin and superficial tissue at the injection site using a 22G, 3.5 " needle for the left knee and a 22 G, 2.5" needle for the rigth knee to form an anesthetic tunnel and ensure proper needle placement into each knee joint space. Using a hemostat, the syringe was exchanged with the Euflexxa syringe, and 2cc of Euflexxa was injected into the bilateral knee joint. The patient was in the supine position during the duration of this procedure and the injection approach was from the superolateral aspect.     Ultrasound guidance was used for needle localization with [employer] Edge 2, 9-L MHz linear probe(s). Images were saved and stored for documentation. The bilateral knee joints were well visualized.  Dynamic visualization of the needle(s) was continuous throughout the procedure and maintained good position and correct needle placement.        Patient tolerance: The patient tolerated the procedure well with no immediate complications. There were no adverse reactions to the medication. Patient was instructed to apply ice to the joint for up to 20 minutes at a time and avoid strenuous " activities for 24-36 hours following the injection. The patient was warned of possible blood pressure changes during that time. Following that time, the patient can resume activities as prior to the injection.     The patient was reminded to call the clinic immediately for any adverse side effects as explained in clinic today.     Euflexxa:  Lot: [id number]  Exp: 11/11/2025    Assessment:      Encounter Diagnosis   Name Primary?    Primary osteoarthritis of knees, bilateral Yes        Plan:   1.third Euflexxa injection of bilateral knee received today (see procedure note above)  2. Follow-up in 4 months  3. Patient agreeable to today's plan and all questions were answered    This note is dictated using the M*Modal Fluency Direct word recognition program. There are word recognition mistakes that are occasionally missed on review.

## 2025-06-05 DIAGNOSIS — I10 HYPERTENSION, ESSENTIAL: ICD-10-CM

## 2025-06-05 RX ORDER — AMLODIPINE BESYLATE 10 MG/1
10 TABLET ORAL
Qty: 90 TABLET | Refills: 3 | Status: SHIPPED | OUTPATIENT
Start: 2025-06-05

## 2025-07-24 NOTE — PROGRESS NOTES
Subjective:     Chapincito Burns     Chief Complaint   Patient presents with    Left Knee - Pain    Right Knee - Pain     HPI    Chapincito is a 54 y.o. female coming in today for bilateral knee pain, s/p B Euflexxa series on 3/26/25. Since last visit the pain has Improved somewhat. 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 Pt. describes the pain as a  5/10 achy pain that does not radiate. There has not been any new a fall/injury/ or traumas since last visit.  Pt. denies any new musculoskeletal complaints at this time.  Patient is still working on weight loss in order to be a candidate for a TKA. She plans to join [employer] and do water aerobics.    Office note from 3/26/25 reviewed    Joint instability? yes  Mechanical locking/clicking? yes  Affecting ADL's? yes  Affecting sleep? yes    Occupation: teacher    Procedures reviewed:   5/14/24 CSI B knee- good relief x 2-3 weeks  9/4/24 Euflexxa B knee- good relief x 6 months  3/26/25 Euflexxa B knee- moderate relief, ongoing    PAST MEDICAL HISTORY:   Past Medical History:   Diagnosis Date    AR (allergic rhinitis)     Asthma     with bad sinus infection and when I had covid    History of endometriosis     Hypertension     Overweight(278.02)     Sinusitis     Vitamin D deficiency      PAST SURGICAL HISTORY:   Past Surgical History:   Procedure Laterality Date    ADENOIDECTOMY      COLONOSCOPY N/A 4/25/2024    Procedure: COLONOSCOPY;  Surgeon: Murphy Cruz MD;  Location: [facility] ([address] [place] [address]);  Service: Colon and Rectal;  Laterality: N/A;  BMI: 53  Referral:  Jessica Leyva PA-C  PEG  Inst portal  LW  4/3-precall complete-MS    NOSE SURGERY      nasal passage from hole d/t tooth removal at dentist    PELVIC LAPAROSCOPY  2002    CUROLE    TONSILLECTOMY      TUBE THORACOTOMY       FAMILY HISTORY:   Family History   Problem Relation Name Age of Onset    Breast cancer Mother Lula 68        ER/FL+  DCIS s/p mastectomy. No genetic testing.    Asthma Father Luis Manuel     Lung disease Father Luis Manuel         s/p lobectomy for cancer vs precancer; former smoker    Breast cancer Maternal Grandmother  60    Diabetes Paternal Grandmother      Prostate cancer Paternal Grandfather      Breast cancer Maternal Aunt Isidra 49        contralateral at 60, no genetic testing    Breast cancer Maternal Aunt Nataly (-) 73        Negative genetic testing    Prostate cancer Maternal Uncle David     Vaginal cancer Paternal Aunt Dacia     Liver cancer Paternal Aunt Siobhan     Other Paternal Aunt Isidra         brain?    Colon cancer Neg Hx      Ovarian cancer Neg Hx      Stroke Neg Hx      Hypertension Neg Hx       SOCIAL HISTORY:   Social History     Socioeconomic History    Marital status: Single   Occupational History     Comment: teacher   Tobacco Use    Smoking status: Never     Passive exposure: Never    Smokeless tobacco: Never   Substance and Sexual Activity    Alcohol use: No    Drug use: No    Sexual activity: Yes     Partners: Male     Birth control/protection: Injection     Social Drivers of Health     Financial Resource Strain: Low Risk  (3/11/2025)    Overall Financial Resource Strain (CARDIA)     Difficulty of Paying Living Expenses: Not very hard   Food Insecurity: No Food Insecurity (3/11/2025)    Hunger Vital Sign     Worried About Running Out of Food in the Last Year: Never true     Ran Out of Food in the Last Year: Never true   Transportation Needs: No Transportation Needs (3/11/2025)    PRAPARE - Transportation     Lack of Transportation (Medical): No     Lack of Transportation (Non-Medical): No   Physical Activity: Insufficiently Active (3/11/2025)    Exercise Vital Sign     Days of Exercise per Week: 2 days     Minutes of Exercise per Session: 10 min   Stress: No Stress Concern Present (3/11/2025)    [employer] - Occupational Stress Questionnaire     Feeling of Stress : Only a  little   Housing Stability: Low Risk  (3/11/2025)    Housing Stability Vital Sign     Unable to Pay for Housing in the Last Year: No     Number of Times Moved in the Last Year: 0     Homeless in the Last Year: No     MEDICATIONS:     Current Outpatient Medications:     amLODIPine (NORVASC) 10 MG tablet, Take 1 tablet by mouth once daily, Disp: 90 tablet, Rfl: 3    cetirizine (ZYRTEC) 10 MG tablet, Take 10 mg by mouth once daily., Disp: , Rfl:     topiramate (TOPAMAX) 25 MG tablet, Take 1 tablet by mouth twice daily, Disp: 180 tablet, Rfl: 0    vitamin D (VITAMIN D3) 1000 units Tab, Take 1,000 Units by mouth once daily., Disp: , Rfl:   No current facility-administered medications for this visit.    ALLERGIES:   Review of patient's allergies indicates:  No Known Allergies    Objective:   VITAL SIGNS: BP (!) 150/94 (Patient Position: Sitting)   Pulse 105   Ht 5' (1.524 m)   Wt 115.9 kg (255 lb 10 oz)   BMI 49.92 kg/m²    General    Vitals reviewed.  Constitutional: She is oriented to person, place, and time. She appears well-developed and well-nourished.   Neurological: She is alert and oriented to person, place, and time.   Psychiatric: She has a normal mood and affect. Her behavior is normal.           MUSCULOSKELETAL EXAM  BILATERAL KNEE EXAMINATION   Affected side is compared to contralateral knee     Observation:  No edema, erythema, ecchymosis, or effusion noted.  No muscle atrophy of the thighs and calves noted.  No obvious bony deformities noted.   No Genu valgus/varum noted.  No recurvatum noted.    No tibial internal/external torsion.    Posture:  Upright and Posterior pelvis tilt with loss of lumbar lordosis  Gait: Left antalgic with Neutral ankle mechanics and Neutral medial arch    Tenderness:  Patella - none    Lateral joint line - none  Quad tendon - none   Medial joint line - + on left  Patellar tendon - none  Medial plica - none  Tibial tubercle - none   Lateral plica - none  Pes anserine -  none   MCL prox - none  Distal ITB - none   MCL distal - none  MFC - none    LCL prox - none  LFC - none    LCL distal - none  Tibia - none    Fibula - none    No obvious bursae, plicae, popliteal cysts, or tendon derangement palpated.          ROM (* = with pain):   Active extension to 0° on left without hyperextension, lag, crepitus, or patellar J sign.   Active extension to 0° on right without hyperextension, lag, crepitus, or patellar J sign.  Active flexion to 100° on left* and 125° on right    Strength(* = with pain):  Knee Flexion - 5/5 on left and 5/5 on right  Knee Extension - 5/5 on left and 5/5 on right  Hip Flexion - 5/5 on left and 5/5 on right  Hip Extension - 5/5 on left and 5/5 on right  Ankle dorsiflexion - 5/5 on left and 5/5 on right  Ankle Plantarflexion - 5/5 on left and 5/5 on right    Patellofemoral Exam:   Patellar ballottement - negative  Bulge sign - negative  Patellar grind - negative    No patellar laxity with medial and lateral translation   No apprehension with medial and lateral patellar translation.     Meniscus Testing:     + pain with terminal flexion at medial joint lines on left    Ligament Testing:  Lachman's test - negative  No laxity with anterior drawer.  No laxity with posterior drawer.    No laxity with varus testing at 0 and 30 degrees.  No laxity with valgus testing at 0 and 30 degrees.    IT band testing:  Noble Compression test - negative    Neurovascular Examination:   Sensation intact to light touch in the obturator, lateral/intermediate/medial/posterior femoral cutaneous, saphenous, and common peroneal nerves bilaterally.  Motor Function:    Fully intact motor function at hip, knee, foot and ankle.  Negative seated straight leg raise bilaterally.    Pulses intact at the DP and PT arteries bilaterally.    Capillary refill intact <2 seconds in all toes bilaterally.    IMAGIN. X-ray ordered due to bilateral knee pain. (AP bilateral standing, PA bilateral standing in  flexion, bilateral merchants, and  bilateral lateral views) taken today.   2. X-ray images were reviewed personally by me and then directly with patient.  3. FINDINGS: Right: There is DJD and a varus deformity.  No fracture, dislocation, bone destruction, or OCD seen.  There is a spur on the patella.  There may be a lateral patellar tracking disorder.  Left: There is DJD and a varus deformity.  No fracture, dislocation, bone destruction, or OCD seen.  There is chondromalacia patella and a lateral patellar tracking disorder.  4. IMPRESSION:Kellgren-Priyank grade 4 OA bilaterally, left worse than right, with associated varus deformities and lateral tracking patellas.      Large Joint Aspiration/Injection  Knee joint, bilateral    Performed by: EULALIO WINSTON  Authorized by: EULALIO WINSTON  Consent Done?: Yes (Verbal)  Indications: Pain  Site marked: The procedure site was marked   Timeout: Prior to procedure the correct patient, procedure, and site was verified     Location: Knee joint, bilateral  Prep: Patient was prepped with Chlorhexidine and alcohol.  Skin anesthetic: Ethyl Chloride spray was used prior to skin puncture.  Ultrasound Guidance for needle placement: yes  Procedure: After local anesthetic was applied, the 22G, 2.5 needle was used to enter the bilateral knee joint capsule under US guidance. A 3 cc mixture of 1 cc of 40 mg/ml triamcinolone acetonide and 2 cc of 0.2% Naropin was injected into the bilateral knee joint.   Approach: superolateral  Medications: 40 mg triamcinolone acetonide 40 mg/mL  Patient tolerance: Patient tolerated the procedure well with no immediate complications    Ultrasound guidance was used for needle localization with SonoSite,  15-4 MHz linear probe. Images were saved and stored for documentation. The knee joint was visualized. Short and long axis images of the anterior bilateral knee were taken prior to injection.Dynamic visualization of the needle(s) was continuous  throughout the procedure and maintained good position and correct needle placement.      Triamcinolone:  NDC: 8189-4475-59  LOT: [id number]  EXP: 12/2026     Assessment:      Encounter Diagnoses   Name Primary?    Bilateral primary osteoarthritis of knee Yes    Chronic pain of both knees     Severe obesity (BMI >= 40)         Plan:   1. Bilateral knee pain secondary to severe DJD changes as noted on x-ray- somewhat improved with recent Euflexxa injection series completed on 3/26/25  - Discussed conservative therapy of OA with continued injection therapy (repeat corticosteroid, viscosupplementation, vs. PRP), Ice up to 20 minutes at a time, and NSAIDs for breakthrough pain. Pt. Would proceed with repeat bilateral euflexxa injections on or after 9/27/2025 and bilateral CSI's today for breakthrough pain.   - Patient received an ultrasound guided corticosteroid injection of the bilateral knees today (see details above).    - continue weight loss efforts to obtain BMI goals for TKA surgery with Dr. Singh   -  X-ray images of bilateral knee taken 4/26/24 (AP bilateral standing, PA bilateral standing in flexion, bilateral merchants, and bilateral lateral views) showed Kellgren-Priyank grade 4 OA bilaterally. Images were personally reviewed with patient.  -  X-ray images of bilateral knee taken today (AP bilateral standing, PA bilateral standing in flexion, bilateral merchants, and bilateral lateral views) showed Kellgren-Priyank grade 4 OA bilaterally, left worse than right, with associated varus deformities and lateral tracking patellas. Images were personally reviewed with patient.    2.  Follow-up in 2 months for bilateral ultrasound-guided Euflexxa injection series, as discussed above.     3. Patient agreeable to today's plan and all questions were answered    This note is dictated using the M*Modal Fluency Direct word recognition program. There are word recognition mistakes that are occasionally missed on  review.

## 2025-07-25 ENCOUNTER — OFFICE VISIT (OUTPATIENT)
Dept: SPORTS MEDICINE | Facility: CLINIC | Age: 55
End: 2025-07-25
Payer: COMMERCIAL

## 2025-07-25 ENCOUNTER — HOSPITAL ENCOUNTER (OUTPATIENT)
Dept: RADIOLOGY | Facility: HOSPITAL | Age: 55
Discharge: HOME | End: 2025-07-25
Attending: NEUROMUSCULOSKELETAL MEDICINE & OMM
Payer: COMMERCIAL

## 2025-07-25 VITALS
SYSTOLIC BLOOD PRESSURE: 150 MMHG | HEART RATE: 105 BPM | WEIGHT: 255.63 LBS | HEIGHT: 60 IN | BODY MASS INDEX: 50.19 KG/M2 | DIASTOLIC BLOOD PRESSURE: 94 MMHG

## 2025-07-25 DIAGNOSIS — G89.29 CHRONIC PAIN OF BOTH KNEES: ICD-10-CM

## 2025-07-25 DIAGNOSIS — E66.01 SEVERE OBESITY (BMI >= 40): ICD-10-CM

## 2025-07-25 DIAGNOSIS — M17.0 BILATERAL PRIMARY OSTEOARTHRITIS OF KNEE: Primary | ICD-10-CM

## 2025-07-25 DIAGNOSIS — M25.562 PAIN IN BOTH KNEES, UNSPECIFIED CHRONICITY: ICD-10-CM

## 2025-07-25 DIAGNOSIS — M25.561 PAIN IN BOTH KNEES, UNSPECIFIED CHRONICITY: ICD-10-CM

## 2025-07-25 DIAGNOSIS — M25.562 CHRONIC PAIN OF BOTH KNEES: ICD-10-CM

## 2025-07-25 DIAGNOSIS — M25.561 CHRONIC PAIN OF BOTH KNEES: ICD-10-CM

## 2025-07-25 PROCEDURE — 73564 X-RAY EXAM KNEE 4 OR MORE: CPT | Mod: TC,50

## 2025-07-25 PROCEDURE — 73564 X-RAY EXAM KNEE 4 OR MORE: CPT | Mod: 26,,, | Performed by: RADIOLOGY

## 2025-07-25 PROCEDURE — 99999 PR PBB SHADOW E&M-EST. PATIENT-LVL III: CPT | Mod: PBBFAC,,, | Performed by: NEUROMUSCULOSKELETAL MEDICINE & OMM

## 2025-07-25 RX ORDER — TRIAMCINOLONE ACETONIDE 40 MG/ML
40 INJECTION, SUSPENSION INTRA-ARTICULAR; INTRAMUSCULAR
Status: COMPLETED | OUTPATIENT
Start: 2025-07-25 | End: 2025-07-25

## 2025-07-25 RX ADMIN — TRIAMCINOLONE ACETONIDE 40 MG: 40 INJECTION, SUSPENSION INTRA-ARTICULAR; INTRAMUSCULAR at 09:07
